# Patient Record
Sex: MALE | Race: BLACK OR AFRICAN AMERICAN | Employment: OTHER | URBAN - METROPOLITAN AREA
[De-identification: names, ages, dates, MRNs, and addresses within clinical notes are randomized per-mention and may not be internally consistent; named-entity substitution may affect disease eponyms.]

---

## 2022-03-30 ENCOUNTER — APPOINTMENT (OUTPATIENT)
Dept: CT IMAGING | Age: 52
DRG: 469 | End: 2022-03-30
Payer: MEDICAID

## 2022-03-30 ENCOUNTER — HOSPITAL ENCOUNTER (INPATIENT)
Age: 52
LOS: 2 days | Discharge: NURSING, REHAB OR LONG-TERM CARE | DRG: 469 | End: 2022-04-01
Attending: STUDENT IN AN ORGANIZED HEALTH CARE EDUCATION/TRAINING PROGRAM | Admitting: INTERNAL MEDICINE
Payer: MEDICAID

## 2022-03-30 DIAGNOSIS — E87.20 LACTIC ACIDOSIS: ICD-10-CM

## 2022-03-30 DIAGNOSIS — F10.10 ALCOHOL ABUSE: Primary | ICD-10-CM

## 2022-03-30 LAB
A/G RATIO: 0.9 (ref 1.1–2.2)
ALBUMIN SERPL-MCNC: 3.3 G/DL (ref 3.4–5)
ALP BLD-CCNC: 118 U/L (ref 40–129)
ALT SERPL-CCNC: 50 U/L (ref 10–40)
ANION GAP SERPL CALCULATED.3IONS-SCNC: 17 MMOL/L (ref 3–16)
AST SERPL-CCNC: 146 U/L (ref 15–37)
BASOPHILS ABSOLUTE: 0 K/UL (ref 0–0.2)
BASOPHILS RELATIVE PERCENT: 0.4 %
BILIRUB SERPL-MCNC: 2 MG/DL (ref 0–1)
BUN BLDV-MCNC: 19 MG/DL (ref 7–20)
CALCIUM SERPL-MCNC: 8.9 MG/DL (ref 8.3–10.6)
CHLORIDE BLD-SCNC: 105 MMOL/L (ref 99–110)
CO2: 19 MMOL/L (ref 21–32)
CREAT SERPL-MCNC: 1.9 MG/DL (ref 0.9–1.3)
EOSINOPHILS ABSOLUTE: 0 K/UL (ref 0–0.6)
EOSINOPHILS RELATIVE PERCENT: 0.1 %
ETHANOL: 187 MG/DL (ref 0–0.08)
GFR AFRICAN AMERICAN: 45
GFR NON-AFRICAN AMERICAN: 37
GLUCOSE BLD-MCNC: 105 MG/DL (ref 70–99)
HCT VFR BLD CALC: 40.5 % (ref 40.5–52.5)
HEMOGLOBIN: 13.5 G/DL (ref 13.5–17.5)
LACTIC ACID, SEPSIS: 4.9 MMOL/L (ref 0.4–1.9)
LACTIC ACID: 5.4 MMOL/L (ref 0.4–2)
LIPASE: 22 U/L (ref 13–60)
LYMPHOCYTES ABSOLUTE: 0.5 K/UL (ref 1–5.1)
LYMPHOCYTES RELATIVE PERCENT: 4.8 %
MCH RBC QN AUTO: 28.5 PG (ref 26–34)
MCHC RBC AUTO-ENTMCNC: 33.2 G/DL (ref 31–36)
MCV RBC AUTO: 85.6 FL (ref 80–100)
MONOCYTES ABSOLUTE: 0.4 K/UL (ref 0–1.3)
MONOCYTES RELATIVE PERCENT: 4.1 %
NEUTROPHILS ABSOLUTE: 10 K/UL (ref 1.7–7.7)
NEUTROPHILS RELATIVE PERCENT: 90.6 %
PDW BLD-RTO: 17.2 % (ref 12.4–15.4)
PLATELET # BLD: 130 K/UL (ref 135–450)
PMV BLD AUTO: 7.9 FL (ref 5–10.5)
POTASSIUM REFLEX MAGNESIUM: 4.5 MMOL/L (ref 3.5–5.1)
RBC # BLD: 4.73 M/UL (ref 4.2–5.9)
SODIUM BLD-SCNC: 141 MMOL/L (ref 136–145)
TOTAL PROTEIN: 6.9 G/DL (ref 6.4–8.2)
WBC # BLD: 11 K/UL (ref 4–11)

## 2022-03-30 PROCEDURE — 99283 EMERGENCY DEPT VISIT LOW MDM: CPT

## 2022-03-30 PROCEDURE — 82077 ASSAY SPEC XCP UR&BREATH IA: CPT

## 2022-03-30 PROCEDURE — 80053 COMPREHEN METABOLIC PANEL: CPT

## 2022-03-30 PROCEDURE — 96361 HYDRATE IV INFUSION ADD-ON: CPT

## 2022-03-30 PROCEDURE — 85025 COMPLETE CBC W/AUTO DIFF WBC: CPT

## 2022-03-30 PROCEDURE — 6360000004 HC RX CONTRAST MEDICATION: Performed by: STUDENT IN AN ORGANIZED HEALTH CARE EDUCATION/TRAINING PROGRAM

## 2022-03-30 PROCEDURE — 83605 ASSAY OF LACTIC ACID: CPT

## 2022-03-30 PROCEDURE — 74177 CT ABD & PELVIS W/CONTRAST: CPT

## 2022-03-30 PROCEDURE — 83690 ASSAY OF LIPASE: CPT

## 2022-03-30 PROCEDURE — 36415 COLL VENOUS BLD VENIPUNCTURE: CPT

## 2022-03-30 PROCEDURE — 2580000003 HC RX 258: Performed by: STUDENT IN AN ORGANIZED HEALTH CARE EDUCATION/TRAINING PROGRAM

## 2022-03-30 PROCEDURE — 96360 HYDRATION IV INFUSION INIT: CPT

## 2022-03-30 PROCEDURE — 1200000000 HC SEMI PRIVATE

## 2022-03-30 RX ORDER — SENNA PLUS 8.6 MG/1
1 TABLET ORAL 2 TIMES DAILY
COMMUNITY

## 2022-03-30 RX ORDER — SODIUM CHLORIDE 1000 MG
1 TABLET, SOLUBLE MISCELLANEOUS DAILY
COMMUNITY

## 2022-03-30 RX ORDER — 0.9 % SODIUM CHLORIDE 0.9 %
1000 INTRAVENOUS SOLUTION INTRAVENOUS ONCE
Status: COMPLETED | OUTPATIENT
Start: 2022-03-30 | End: 2022-03-30

## 2022-03-30 RX ORDER — ACETAMINOPHEN 325 MG/1
650 TABLET ORAL EVERY 4 HOURS PRN
COMMUNITY

## 2022-03-30 RX ORDER — TIZANIDINE 4 MG/1
4 TABLET ORAL EVERY 6 HOURS PRN
COMMUNITY

## 2022-03-30 RX ORDER — POLYETHYLENE GLYCOL 3350 17 G/17G
17 POWDER, FOR SOLUTION ORAL DAILY PRN
COMMUNITY

## 2022-03-30 RX ORDER — NIFEDIPINE 30 MG/1
30 TABLET, FILM COATED, EXTENDED RELEASE ORAL DAILY
COMMUNITY

## 2022-03-30 RX ORDER — FOLIC ACID 1 MG/1
1 TABLET ORAL DAILY
Status: ON HOLD | COMMUNITY
End: 2022-04-01 | Stop reason: SDUPTHER

## 2022-03-30 RX ADMIN — SODIUM CHLORIDE 1000 ML: 9 INJECTION, SOLUTION INTRAVENOUS at 18:05

## 2022-03-30 RX ADMIN — IOPAMIDOL 75 ML: 755 INJECTION, SOLUTION INTRAVENOUS at 18:50

## 2022-03-30 RX ADMIN — SODIUM CHLORIDE 1000 ML: 9 INJECTION, SOLUTION INTRAVENOUS at 20:30

## 2022-03-30 NOTE — ED TRIAGE NOTES
Pt arrives via medic from NH intoxicated. Pt reportedly signed himself out of the nursing home today to go buy alcohol, which he does frequently per nursing home staff. Medics report pt called 3 times first for a ride back to the nursing home, second for feeling weak but then he changed his mind and did not want to come. The third time he called they brought him in to the ER. Pt reports throwing up x1. States he drank 2 24oz. beers today. Smells of alcohol. A&Ox3.

## 2022-03-30 NOTE — ED PROVIDER NOTES
Primary Care Physician: Corinne Mendez MD   Attending Physician: No att. providers found     History   Chief Complaint   Patient presents with    Alcohol Intoxication     left nursing home to buy beer. medics were called on him 3 times today the final time they borught him to the ED. pt reports dinking 2 24 oz beers today        HPI   Braxton Bradley  is a 47 y.o. male history of alcohol abuse with liver cirrhosis, hyperlipidemia who presents complaining of abdominal pain associated with nausea vomiting. Patient stated that he been drinking heavily today. He started throwing up presented to the emergency room. Past Medical History:   Diagnosis Date    Alcohol abuse     Alcoholic cirrhosis of liver (HCC)     Anemia     Gastro-esophageal reflux disease with esophagitis     Hyperlipidemia     Thrombocytopenia (HCC)     Vitamin D deficiency         History reviewed. No pertinent surgical history. History reviewed. No pertinent family history. Social History     Socioeconomic History    Marital status: Single     Spouse name: None    Number of children: None    Years of education: None    Highest education level: None   Occupational History    None   Tobacco Use    Smoking status: Never Smoker    Smokeless tobacco: Never Used   Vaping Use    Vaping Use: Never used   Substance and Sexual Activity    Alcohol use: Yes     Comment: daily beer    Drug use: Never    Sexual activity: None   Other Topics Concern    None   Social History Narrative    None     Social Determinants of Health     Financial Resource Strain:     Difficulty of Paying Living Expenses: Not on file   Food Insecurity:     Worried About Running Out of Food in the Last Year: Not on file    Moses of Food in the Last Year: Not on file   Transportation Needs:     Lack of Transportation (Medical): Not on file    Lack of Transportation (Non-Medical):  Not on file   Physical Activity:     Days of Exercise per Week: Not on file   [employer] of Exercise per Session: Not on file   Stress:     Feeling of Stress : Not on file   Social Connections:     Frequency of Communication with Friends and Family: Not on file    Frequency of Social Gatherings with Friends and Family: Not on file    Attends Buddhist Services: Not on file    Active Member of [address] [place] [address] or Organizations: Not on file    Attends Club or Organization Meetings: Not on file    Marital Status: Not on file   Intimate Partner Violence:     Fear of Current or Ex-Partner: Not on file    Emotionally Abused: Not on file    Physically Abused: Not on file    Sexually Abused: Not on file   Housing Stability:     Unable to Pay for Housing in the Last Year: Not on file    Number of [place] in the Last Year: Not on file    Unstable Housing in the Last Year: Not on file        Review of Systems   10 total systems reviewed and found to be negative unless otherwise noted in HPI     Physical Exam   /72   Pulse 120   Temp 98.2 °F (36.8 °C) (Oral)   Resp 18   Ht 6' 2\" (1.88 m)   Wt 212 lb 15.4 oz (96.6 kg)   SpO2 95%   BMI 27.34 kg/m²      CONSTITUTIONAL: Well appearing, in no acute distress   HEAD: atraumatic, normocephalic   EYES: PERRL, No injection, discharge or scleral icterus. ENT: Moist mucous membranes. NECK: Normal ROM, NO LAD   CARDIOVASCULAR: Regular rate and rhythm. No murmurs or gallop. PULMONARY/CHEST: Airway patent. No retractions. Breath sounds clear with good air entry bilaterally. ABDOMEN: Soft, Non-distended and non-tender, without guarding or rebound. SKIN: Acyanotic, warm, dry   MUSCULOSKELETAL: No swelling, tenderness or deformity   NEUROLOGICAL: Awake and oriented x 3. Pulses intact. Grossly nonfocal   Nursing note and vitals reviewed.      ED Course & Medical Decision Making   Medications   0.9 % sodium chloride bolus (0 mLs IntraVENous Stopped 3/30/22 1905)   iopamidol (ISOVUE-370) 76 % injection 75 mL (75 mLs IntraVENous Given 3/30/22 1850)   0.9 % sodium chloride bolus (0 mLs IntraVENous Stopped 3/30/22 2130)   magnesium sulfate 1000 mg in dextrose 5% 100 mL IVPB (0 mg IntraVENous Stopped 4/1/22 1848)      Labs Reviewed   CBC WITH AUTO DIFFERENTIAL - Abnormal; Notable for the following components:       Result Value    RDW 17.2 (*)     Platelets 789 (*)     Neutrophils Absolute 10.0 (*)     Lymphocytes Absolute 0.5 (*)     All other components within normal limits   COMPREHENSIVE METABOLIC PANEL W/ REFLEX TO MG FOR LOW K - Abnormal; Notable for the following components:    CO2 19 (*)     Anion Gap 17 (*)     Glucose 105 (*)     CREATININE 1.9 (*)     GFR Non- 37 (*)     GFR  45 (*)     Albumin 3.3 (*)     Albumin/Globulin Ratio 0.9 (*)     Total Bilirubin 2.0 (*)     ALT 50 (*)      (*)     All other components within normal limits   LACTATE, SEPSIS - Abnormal; Notable for the following components:    Lactic Acid, Sepsis 4.9 (*)     All other components within normal limits    Narrative:     Lyndal Reveal tel. [phone],  Chemistry results called to and read back by Jill Harris RN,  03/30/2022 18:41, by Daniel Cullen   LACTIC ACID - Abnormal; Notable for the following components:    Lactic Acid 5.4 (*)     All other components within normal limits    Narrative:     Lyndal Reveal tel. [phone],  Chemistry results called to and read back by ESTRELLA Linares., 03/30/2022 22:02, by  MATILDA   LACTIC ACID - Abnormal; Notable for the following components:    Lactic Acid 6.8 (*)     All other components within normal limits    Narrative:     Theoplis Caleb  [facility] tel. [phone],  Chemistry results called to and read back by Gregorio Peng RN, 03/31/2022  03:04, by BLUSH  Collection has been rescheduled by LISSA at 03/31/2022 00:59 Reason:   2000hr should have been drawn in the ed  Draw 2am    LACTIC ACID - Abnormal; Notable for the following components:    Lactic Acid 3.6 (*)     All other components within normal limits    Narrative:     Collection has been rescheduled by CHRYSTAL at 03/31/2022 08:35 Reason:   Failed attempt at venipuncture   CBC WITH AUTO DIFFERENTIAL - Abnormal; Notable for the following components:    WBC 17.7 (*)     Hemoglobin 13.1 (*)     Hematocrit 39.1 (*)     RDW 17.0 (*)     Platelets 92 (*)     Neutrophils Absolute 16.2 (*)     Lymphocytes Absolute 0.8 (*)     All other components within normal limits   COMPREHENSIVE METABOLIC PANEL W/ REFLEX TO MG FOR LOW K - Abnormal; Notable for the following components:    CO2 17 (*)     GFR Non-African American 58 (*)     Calcium 8.2 (*)     Total Protein 6.3 (*)     Albumin 2.7 (*)     Albumin/Globulin Ratio 0.8 (*)     Total Bilirubin 1.2 (*)     ALT 63 (*)      (*)     All other components within normal limits   LACTIC ACID - Abnormal; Notable for the following components:    Lactic Acid 3.7 (*)     All other components within normal limits   LACTIC ACID - Abnormal; Notable for the following components:    Lactic Acid 2.7 (*)     All other components within normal limits   LACTIC ACID - Abnormal; Notable for the following components:    Lactic Acid 2.1 (*)     All other components within normal limits   CBC WITH AUTO DIFFERENTIAL - Abnormal; Notable for the following components:    Hemoglobin 12.7 (*)     Hematocrit 38.2 (*)     RDW 16.9 (*)     Platelets 90 (*)     Neutrophils Absolute 9.1 (*)     All other components within normal limits    Narrative:     Collection has been rescheduled by LISSA at 04/01/2022 05:28 Reason:   Draw at 730a w/8am labs   COMPREHENSIVE METABOLIC PANEL W/ REFLEX TO MG FOR LOW K - Abnormal; Notable for the following components:    CREATININE 0.7 (*)     Albumin 2.7 (*)     Albumin/Globulin Ratio 0.6 (*)     Total Bilirubin 1.3 (*)     ALT 49 (*)     AST 88 (*)     All other components within normal limits    Narrative:     Collection has been rescheduled by LISSA at 04/01/2022 05:28 Reason:   Draw at 730a w/8am labs MAGNESIUM - Abnormal; Notable for the following components:    Magnesium 1.60 (*)     All other components within normal limits    Narrative:     Collection has been rescheduled by LISSA at 04/01/2022 05:28 Reason:   Draw at 730a w/8am labs   BILIRUBIN, DIRECT - Abnormal; Notable for the following components:    Bilirubin, Direct 0.5 (*)     All other components within normal limits    Narrative:     Collection has been rescheduled by Ratna Ewing at 04/01/2022 12:09 Reason: Add   on    [address] ACID    Narrative:     Collection has been rescheduled by Marlena Severs at 04/01/2022 05:28 Reason:   Draw at 730a w/8am labs   URINALYSIS WITH REFLEX TO CULTURE      VL AORTA/IVC/ILIAC/BYPASS GRAFT LIMITED   Final Result      US ABDOMEN LIMITED Specify organ? LIVER, GALLBLADDER   Final Result   Hepatic steatosis with subtle/early cirrhotic changes suspected. CT ABDOMEN PELVIS W IV CONTRAST Additional Contrast? None   Final Result   No acute pathology in the abdomen and pelvis. No evidence of acute   appendicitis. PROCEDURES:   Procedures    ASSESSMENT AND PLAN:  [id number], Jessa Rubio is a 47 y.o. male history of alcohol abuse with liver cirrhosis, hyperlipidemia who presents complaining of abdominal pain associated with nausea vomiting. Patient stated that he been drinking heavily today. He started throwing up presented to the emergency room. On exam patient was hypotensive when he presented. Abdomen was tender to palpation. I did obtain labs which was significant for lactate of 4. Patient was given 2 L of IV fluids and lactate is still up to 4.5. A CT of the abdomen pelvis has been obtained and showed no significant surgical abdominal pathology. The cause of elevated lactate at this time probably could be secondary to his alcohol versus liver disease.   Nonetheless with this abnormal finding and recommending the patient be admitted for further evaluation and

## 2022-03-31 ENCOUNTER — APPOINTMENT (OUTPATIENT)
Dept: ULTRASOUND IMAGING | Age: 52
DRG: 469 | End: 2022-03-31
Payer: MEDICAID

## 2022-03-31 PROBLEM — R10.9 ABDOMINAL PAIN: Status: ACTIVE | Noted: 2022-03-31

## 2022-03-31 PROBLEM — R11.2 NAUSEA AND VOMITING: Status: ACTIVE | Noted: 2022-03-31

## 2022-03-31 PROBLEM — R74.01 TRANSAMINITIS: Status: ACTIVE | Noted: 2022-03-31

## 2022-03-31 PROBLEM — E44.0 MODERATE PROTEIN-CALORIE MALNUTRITION (HCC): Status: ACTIVE | Noted: 2022-03-31

## 2022-03-31 PROBLEM — N17.9 ACUTE RENAL FAILURE (ARF) (HCC): Status: ACTIVE | Noted: 2022-03-31

## 2022-03-31 PROBLEM — R17 ELEVATED BILIRUBIN: Status: ACTIVE | Noted: 2022-03-31

## 2022-03-31 PROBLEM — F10.929 ALCOHOL INTOXICATION (HCC): Status: ACTIVE | Noted: 2022-03-31

## 2022-03-31 PROBLEM — R79.89 ELEVATED LACTIC ACID LEVEL: Status: ACTIVE | Noted: 2022-03-31

## 2022-03-31 PROBLEM — K70.30 ALCOHOLIC CIRRHOSIS (HCC): Status: ACTIVE | Noted: 2022-03-31

## 2022-03-31 PROBLEM — E87.29 HIGH ANION GAP METABOLIC ACIDOSIS: Status: ACTIVE | Noted: 2022-03-31

## 2022-03-31 PROBLEM — E88.09 HYPOALBUMINEMIA: Status: ACTIVE | Noted: 2022-03-31

## 2022-03-31 LAB
A/G RATIO: 0.8 (ref 1.1–2.2)
ALBUMIN SERPL-MCNC: 2.7 G/DL (ref 3.4–5)
ALP BLD-CCNC: 97 U/L (ref 40–129)
ALT SERPL-CCNC: 63 U/L (ref 10–40)
ANION GAP SERPL CALCULATED.3IONS-SCNC: 16 MMOL/L (ref 3–16)
AST SERPL-CCNC: 183 U/L (ref 15–37)
BASOPHILS ABSOLUTE: 0 K/UL (ref 0–0.2)
BASOPHILS RELATIVE PERCENT: 0.2 %
BILIRUB SERPL-MCNC: 1.2 MG/DL (ref 0–1)
BUN BLDV-MCNC: 19 MG/DL (ref 7–20)
CALCIUM SERPL-MCNC: 8.2 MG/DL (ref 8.3–10.6)
CHLORIDE BLD-SCNC: 109 MMOL/L (ref 99–110)
CO2: 17 MMOL/L (ref 21–32)
CREAT SERPL-MCNC: 1.3 MG/DL (ref 0.9–1.3)
EOSINOPHILS ABSOLUTE: 0 K/UL (ref 0–0.6)
EOSINOPHILS RELATIVE PERCENT: 0 %
GFR AFRICAN AMERICAN: >60
GFR NON-AFRICAN AMERICAN: 58
GLUCOSE BLD-MCNC: 90 MG/DL (ref 70–99)
HCT VFR BLD CALC: 39.1 % (ref 40.5–52.5)
HEMOGLOBIN: 13.1 G/DL (ref 13.5–17.5)
INR BLD: 0.92 (ref 0.88–1.12)
LACTIC ACID: 2.7 MMOL/L (ref 0.4–2)
LACTIC ACID: 3.6 MMOL/L (ref 0.4–2)
LACTIC ACID: 3.7 MMOL/L (ref 0.4–2)
LACTIC ACID: 6.8 MMOL/L (ref 0.4–2)
LYMPHOCYTES ABSOLUTE: 0.8 K/UL (ref 1–5.1)
LYMPHOCYTES RELATIVE PERCENT: 4.5 %
MCH RBC QN AUTO: 28.8 PG (ref 26–34)
MCHC RBC AUTO-ENTMCNC: 33.4 G/DL (ref 31–36)
MCV RBC AUTO: 86.2 FL (ref 80–100)
MONOCYTES ABSOLUTE: 0.7 K/UL (ref 0–1.3)
MONOCYTES RELATIVE PERCENT: 3.7 %
NEUTROPHILS ABSOLUTE: 16.2 K/UL (ref 1.7–7.7)
NEUTROPHILS RELATIVE PERCENT: 91.6 %
PDW BLD-RTO: 17 % (ref 12.4–15.4)
PLATELET # BLD: 92 K/UL (ref 135–450)
PMV BLD AUTO: 8.1 FL (ref 5–10.5)
POTASSIUM REFLEX MAGNESIUM: 4.1 MMOL/L (ref 3.5–5.1)
PROTHROMBIN TIME: 10.4 SEC (ref 9.9–12.7)
RBC # BLD: 4.54 M/UL (ref 4.2–5.9)
SODIUM BLD-SCNC: 142 MMOL/L (ref 136–145)
TOTAL PROTEIN: 6.3 G/DL (ref 6.4–8.2)
WBC # BLD: 17.7 K/UL (ref 4–11)

## 2022-03-31 PROCEDURE — 2580000003 HC RX 258: Performed by: INTERNAL MEDICINE

## 2022-03-31 PROCEDURE — 6370000000 HC RX 637 (ALT 250 FOR IP): Performed by: INTERNAL MEDICINE

## 2022-03-31 PROCEDURE — 2580000003 HC RX 258: Performed by: NURSE PRACTITIONER

## 2022-03-31 PROCEDURE — 6370000000 HC RX 637 (ALT 250 FOR IP): Performed by: NURSE PRACTITIONER

## 2022-03-31 PROCEDURE — 85610 PROTHROMBIN TIME: CPT

## 2022-03-31 PROCEDURE — 1200000000 HC SEMI PRIVATE

## 2022-03-31 PROCEDURE — 36415 COLL VENOUS BLD VENIPUNCTURE: CPT

## 2022-03-31 PROCEDURE — 6360000002 HC RX W HCPCS: Performed by: INTERNAL MEDICINE

## 2022-03-31 PROCEDURE — 85025 COMPLETE CBC W/AUTO DIFF WBC: CPT

## 2022-03-31 PROCEDURE — 80053 COMPREHEN METABOLIC PANEL: CPT

## 2022-03-31 PROCEDURE — 76705 ECHO EXAM OF ABDOMEN: CPT

## 2022-03-31 PROCEDURE — 83605 ASSAY OF LACTIC ACID: CPT

## 2022-03-31 RX ORDER — FOLIC ACID 1 MG/1
1 TABLET ORAL DAILY
Status: DISCONTINUED | OUTPATIENT
Start: 2022-03-31 | End: 2022-04-02 | Stop reason: HOSPADM

## 2022-03-31 RX ORDER — POLYETHYLENE GLYCOL 3350 17 G/17G
17 POWDER, FOR SOLUTION ORAL DAILY PRN
Status: DISCONTINUED | OUTPATIENT
Start: 2022-03-31 | End: 2022-04-02 | Stop reason: HOSPADM

## 2022-03-31 RX ORDER — SODIUM CHLORIDE 0.9 % (FLUSH) 0.9 %
10 SYRINGE (ML) INJECTION PRN
Status: DISCONTINUED | OUTPATIENT
Start: 2022-03-31 | End: 2022-04-02 | Stop reason: HOSPADM

## 2022-03-31 RX ORDER — CHLORDIAZEPOXIDE HYDROCHLORIDE 5 MG/1
10 CAPSULE, GELATIN COATED ORAL 3 TIMES DAILY
Status: DISCONTINUED | OUTPATIENT
Start: 2022-03-31 | End: 2022-04-02 | Stop reason: HOSPADM

## 2022-03-31 RX ORDER — M-VIT,TX,IRON,MINS/CALC/FOLIC 27MG-0.4MG
1 TABLET ORAL DAILY
Status: DISCONTINUED | OUTPATIENT
Start: 2022-03-31 | End: 2022-04-02 | Stop reason: HOSPADM

## 2022-03-31 RX ORDER — PROMETHAZINE HYDROCHLORIDE 25 MG/ML
25 INJECTION, SOLUTION INTRAMUSCULAR; INTRAVENOUS EVERY 4 HOURS PRN
Status: DISCONTINUED | OUTPATIENT
Start: 2022-03-31 | End: 2022-04-02 | Stop reason: HOSPADM

## 2022-03-31 RX ORDER — SODIUM CHLORIDE 1000 MG
1 TABLET, SOLUBLE MISCELLANEOUS DAILY
Status: DISCONTINUED | OUTPATIENT
Start: 2022-03-31 | End: 2022-04-02 | Stop reason: HOSPADM

## 2022-03-31 RX ORDER — GAUZE BANDAGE 2" X 2"
100 BANDAGE TOPICAL DAILY
Status: DISCONTINUED | OUTPATIENT
Start: 2022-03-31 | End: 2022-04-02 | Stop reason: HOSPADM

## 2022-03-31 RX ORDER — NIFEDIPINE 30 MG/1
30 TABLET, EXTENDED RELEASE ORAL DAILY
Status: DISCONTINUED | OUTPATIENT
Start: 2022-03-31 | End: 2022-04-02 | Stop reason: HOSPADM

## 2022-03-31 RX ORDER — ACETAMINOPHEN 325 MG/1
650 TABLET ORAL EVERY 4 HOURS PRN
Status: DISCONTINUED | OUTPATIENT
Start: 2022-03-31 | End: 2022-04-02 | Stop reason: HOSPADM

## 2022-03-31 RX ORDER — SODIUM CHLORIDE 9 MG/ML
INJECTION, SOLUTION INTRAVENOUS PRN
Status: DISCONTINUED | OUTPATIENT
Start: 2022-03-31 | End: 2022-04-02 | Stop reason: HOSPADM

## 2022-03-31 RX ORDER — PANTOPRAZOLE SODIUM 40 MG/1
40 TABLET, DELAYED RELEASE ORAL
Status: DISCONTINUED | OUTPATIENT
Start: 2022-03-31 | End: 2022-04-02 | Stop reason: HOSPADM

## 2022-03-31 RX ORDER — ACETAMINOPHEN 650 MG/1
650 SUPPOSITORY RECTAL EVERY 4 HOURS PRN
Status: DISCONTINUED | OUTPATIENT
Start: 2022-03-31 | End: 2022-04-02 | Stop reason: HOSPADM

## 2022-03-31 RX ORDER — SODIUM CHLORIDE 9 MG/ML
INJECTION, SOLUTION INTRAVENOUS CONTINUOUS
Status: DISCONTINUED | OUTPATIENT
Start: 2022-03-31 | End: 2022-04-01

## 2022-03-31 RX ORDER — SODIUM CHLORIDE 0.9 % (FLUSH) 0.9 %
10 SYRINGE (ML) INJECTION EVERY 12 HOURS SCHEDULED
Status: DISCONTINUED | OUTPATIENT
Start: 2022-03-31 | End: 2022-04-02 | Stop reason: HOSPADM

## 2022-03-31 RX ORDER — ONDANSETRON 2 MG/ML
4 INJECTION INTRAMUSCULAR; INTRAVENOUS EVERY 4 HOURS PRN
Status: DISCONTINUED | OUTPATIENT
Start: 2022-03-31 | End: 2022-04-02 | Stop reason: HOSPADM

## 2022-03-31 RX ADMIN — ACETAMINOPHEN 650 MG: 325 TABLET ORAL at 01:02

## 2022-03-31 RX ADMIN — THIAMINE HCL TAB 100 MG 100 MG: 100 TAB at 08:31

## 2022-03-31 RX ADMIN — PANTOPRAZOLE SODIUM 40 MG: 40 TABLET, DELAYED RELEASE ORAL at 18:08

## 2022-03-31 RX ADMIN — ACETAMINOPHEN 650 MG: 325 TABLET ORAL at 17:57

## 2022-03-31 RX ADMIN — NIFEDIPINE 30 MG: 30 TABLET, FILM COATED, EXTENDED RELEASE ORAL at 08:31

## 2022-03-31 RX ADMIN — PANTOPRAZOLE SODIUM 40 MG: 40 TABLET, DELAYED RELEASE ORAL at 08:31

## 2022-03-31 RX ADMIN — Medication 1 G: at 08:31

## 2022-03-31 RX ADMIN — ENOXAPARIN SODIUM 40 MG: 40 INJECTION SUBCUTANEOUS at 08:30

## 2022-03-31 RX ADMIN — SODIUM CHLORIDE: 9 INJECTION, SOLUTION INTRAVENOUS at 08:28

## 2022-03-31 RX ADMIN — ACETAMINOPHEN 650 MG: 325 TABLET ORAL at 12:14

## 2022-03-31 RX ADMIN — Medication 1 TABLET: at 08:31

## 2022-03-31 RX ADMIN — SODIUM CHLORIDE, PRESERVATIVE FREE 10 ML: 5 INJECTION INTRAVENOUS at 23:14

## 2022-03-31 RX ADMIN — ONDANSETRON 4 MG: 2 INJECTION INTRAMUSCULAR; INTRAVENOUS at 01:58

## 2022-03-31 RX ADMIN — CHLORDIAZEPOXIDE HYDROCHLORIDE 10 MG: 5 CAPSULE ORAL at 13:15

## 2022-03-31 RX ADMIN — SODIUM CHLORIDE: 9 INJECTION, SOLUTION INTRAVENOUS at 01:31

## 2022-03-31 RX ADMIN — ONDANSETRON 4 MG: 2 INJECTION INTRAMUSCULAR; INTRAVENOUS at 11:08

## 2022-03-31 RX ADMIN — ACETAMINOPHEN 650 MG: 325 TABLET ORAL at 21:18

## 2022-03-31 RX ADMIN — SODIUM CHLORIDE: 9 INJECTION, SOLUTION INTRAVENOUS at 17:59

## 2022-03-31 RX ADMIN — FOLIC ACID 1 MG: 1 TABLET ORAL at 08:31

## 2022-03-31 RX ADMIN — CHLORDIAZEPOXIDE HYDROCHLORIDE 10 MG: 5 CAPSULE ORAL at 21:18

## 2022-03-31 ASSESSMENT — PAIN DESCRIPTION - LOCATION
LOCATION: ABDOMEN;HEAD
LOCATION: GENERALIZED
LOCATION: ABDOMEN

## 2022-03-31 ASSESSMENT — PAIN SCALES - GENERAL
PAINLEVEL_OUTOF10: 0
PAINLEVEL_OUTOF10: 0
PAINLEVEL_OUTOF10: 4
PAINLEVEL_OUTOF10: 4
PAINLEVEL_OUTOF10: 6
PAINLEVEL_OUTOF10: 0
PAINLEVEL_OUTOF10: 5
PAINLEVEL_OUTOF10: 0
PAINLEVEL_OUTOF10: 0

## 2022-03-31 ASSESSMENT — PAIN DESCRIPTION - DESCRIPTORS
DESCRIPTORS: ACHING
DESCRIPTORS: DISCOMFORT

## 2022-03-31 ASSESSMENT — PAIN DESCRIPTION - ORIENTATION: ORIENTATION: MID

## 2022-03-31 ASSESSMENT — PAIN DESCRIPTION - FREQUENCY
FREQUENCY: INTERMITTENT
FREQUENCY: INTERMITTENT

## 2022-03-31 NOTE — CONSULTS
GASTROENTEROLOGY INPATIENT CONSULTATION        IDENTIFYING DATA/REASON FOR CONSULTATION   PATIENT:  Jessa Rubio  MRN:  [id number]  ADMIT DATE: 3/30/2022  TIME OF EVALUATION: 3/31/2022 3:15 PM  HOSPITAL STAY:   LOS: 1 day     REASON FOR CONSULTATION:  Elevated LFTs, Alcohol cirrhosis    HISTORY OF PRESENT ILLNESS   Jessa Rubio is a 47 y.o. male with a PMH of daily alcohol use who presented on 3/30/2022 with abdominal pain, nausea and vomiting. He was found to be intoxicated in ED. Blood work was notable for elevated LFTs, elevated lactic acid, LAUREN. He was admitted and placed on IV fluids. We have been consulted regarding \"elevated LFTs, alcoholic cirrhosis\"    Patient reports nausea vomiting abdominal pain started last night. He reports he drinks 2-4 beers daily has been drinking for 10+ years. His last alcoholic drink was yesterday afternoon. He does have a history of withdrawal symptoms. He states he felt like he was going into withdrawal last night. He denies any prior diagnosis of liver disease. He denies seeing any blood in his vomit. Today he is feeling better. He last vomited this morning. He has been able to tolerate liquids in the afternoon. He has been having no further abdominal pain. CT A/P shows no acute pathology in the abdomen and pelvis. Liver enhances normally. No ductal dilation. PAST MEDICAL, SURGICAL, FAMILY, and SOCIAL HISTORY     Past Medical History:   Diagnosis Date    Alcohol abuse     Alcoholic cirrhosis of liver (HCC)     Anemia     Gastro-esophageal reflux disease with esophagitis     Hyperlipidemia     Thrombocytopenia (HCC)     Vitamin D deficiency      History reviewed. No pertinent surgical history. History reviewed. No pertinent family history.   Social History     Socioeconomic History    Marital status: Single     Spouse name: None    Number of children: None    Years of education: None    Highest education level: None   Occupational History    None   Tobacco Use    Smoking status: Never Smoker    Smokeless tobacco: Never Used   Vaping Use    Vaping Use: Never used   Substance and Sexual Activity    Alcohol use: Yes     Comment: daily beer    Drug use: Never    Sexual activity: None   Other Topics Concern    None   Social History Narrative    None     Social Determinants of Health     Financial Resource Strain:     Difficulty of Paying Living Expenses: Not on file   Food Insecurity:     Worried About Running Out of Food in the Last Year: Not on file    Moses of Food in the Last Year: Not on file   Transportation Needs:     Lack of Transportation (Medical): Not on file    Lack of Transportation (Non-Medical):  Not on file   Physical Activity:     Days of Exercise per Week: Not on file    Minutes of Exercise per Session: Not on file   Stress:     Feeling of Stress : Not on file   Social Connections:     Frequency of Communication with Friends and Family: Not on file    Frequency of Social Gatherings with Friends and Family: Not on file    Attends [employer] Services: Not on file    Active Member of [address] [place] [address] or Organizations: Not on file    Attends Club or Organization Meetings: Not on file    Marital Status: Not on file   Intimate Partner Violence:     Fear of Current or Ex-Partner: Not on file    Emotionally Abused: Not on file    Physically Abused: Not on file    Sexually Abused: Not on file   Housing Stability:     Unable to Pay for Housing in the Last Year: Not on file    Number of [place] in the Last Year: Not on file    Unstable Housing in the Last Year: Not on file       MEDICATIONS   SCHEDULED:  sodium chloride, 1 g, Daily  NIFEdipine, 30 mg, Daily  folic acid, 1 mg, Daily  therapeutic multivitamin-minerals, 1 tablet, Daily  thiamine mononitrate, 100 mg, Daily  sodium chloride flush, 10 mL, 2 times per day  enoxaparin, 40 mg, Daily  pantoprazole, 40 mg, BID AC  chlordiazePOXIDE, 10 mg, TID      FLUIDS/DRIPS:  sodium chloride      sodium chloride 100 mL/hr at 03/31/22 1208     PRNs: sodium chloride flush, 10 mL, PRN  sodium chloride, , PRN  ondansetron, 4 mg, Q4H PRN  polyethylene glycol, 17 g, Daily PRN  acetaminophen, 650 mg, Q4H PRN   Or  acetaminophen, 650 mg, Q4H PRN  promethazine, 25 mg, Q4H PRN      ALLERGIES:  He   Allergies   Allergen Reactions    Other      Bleach       REVIEW OF SYSTEMS   Pertinent ROS noted in HPI    PHYSICAL EXAM     Vitals:    03/31/22 0221 03/31/22 0531 03/31/22 1415 03/31/22 1431   BP:  (!) 142/75 (!) 147/68    Pulse:  80 85    Resp:  18 16    Temp: 99.9 °F (37.7 °C) 98.9 °F (37.2 °C) 99 °F (37.2 °C)    TempSrc: Oral Oral Oral    SpO2:  99% 98%    Weight:  212 lb 15.4 oz (96.6 kg)     Height:    6' 2\" (1.88 m)       I/O last 3 completed shifts: In: 360 [P.O.:360]  Out: 200 [Emesis/NG output:200]      Physical Exam:  General appearance: alert, cooperative, no distress, appears stated age  Eyes: Anicteric  Head: Normocephalic, without obvious abnormality  Lungs: clear to auscultation bilaterally, Normal Effort  Heart: regular rate and rhythm, normal S1 and S2, no murmurs or rubs  Abdomen: soft, non-distended, non-tender. Bowel sounds normal. No masses,  no organomegaly. Extremities: atraumatic, no cyanosis or edema  Skin: warm and dry, no jaundice  Neuro: Grossly intact, A&OX3, no asterixis      LABS AND IMAGING   Laboratory   Recent Labs     03/30/22 1802 03/31/22  0454   WBC 11.0 17.7*   HGB 13.5 13.1*   HCT 40.5 39.1*   MCV 85.6 86.2   * 92*     Recent Labs     03/30/22  1802 03/31/22  0454    142   K 4.5 4.1    109   CO2 19* 17*   BUN 19 19   CREATININE 1.9* 1.3     Recent Labs     03/30/22 1802 03/31/22  0454   * 183*   ALT 50* 63*   BILITOT 2.0* 1.2*   ALKPHOS 118 97     Recent Labs     03/30/22  1802   LIPASE 22.0     No results for input(s): PROTIME, INR in the last 72 hours.     Imaging  CT ABDOMEN PELVIS W IV CONTRAST Additional Contrast? None Final Result   No acute pathology in the abdomen and pelvis. No evidence of acute   appendicitis. ASSESSMENT AND RECOMMENDATIONS   47 y.o. male with a PMH of PMH of daily alcohol use who presented on 3/30/2022 with abdominal pain, nausea and vomiting. Work-up in the ED notable for elevated LFTs, elevated lactic acid, LAUREN. He was admitted and placed on IV fluids. We have been consulted regarding elevated LFTs      IMPRESSION:  1. Abnormal LFTs suspect secondary to alcohol hepatitis. AST >ALT. Will check PT/INR to calculate [facility] DF score, however suspect no indication for steroids at this time as bilirubin mildly elevated at 1.2. No asterixis on exam.  No evidence of cirrhosis or ductal dilation on CT. Will check right upper quadrant ultrasound. 2. Nausea, vomiting, abdominal pain. Suspect related to alcohol intoxication. He presented to the ED intoxicated. His symptoms have resolved today. CT showed no acute findings. LFTs elevated likely from alcohol but will check right upper quadrant ultrasound. 3. Alcohol abuse disorder. Encouraged alcohol abstinence        RECOMMENDATIONS:    Right upper quadrant ultrasound  PT/INR  Trend LFTs  Encourage alcohol abstinence    If you have any questions or need any further information, please feel free to contact our consult team.  Thank you for allowing us to participate in the care of Beto. The note was completed using Dragon voice recognition transcription. Every effort was made to ensure accuracy; however, inadvertent transcription errors may be present despite my best efforts to edit errors.       Zainab Gould PA-C

## 2022-03-31 NOTE — PROGRESS NOTES
Hospital Medicine Progress Note      Admit Date: 3/30/2022       CC: F/U for abd pain, n/v     HPI: Pt is an 47y.o. year-old male with a history of chronic daily alcohol abuse who presents to the emergency room for evaluation following an acute onset of abdominal pain, nausea and vomiting. He signed himself out of his nursing home today to go buy alcohol. Per ER reports he was drinking heavily today. In the emergency room he was found to be intoxicated. He had mild abdominal tenderness on exam.  He had acute renal failure and an elevated lactic acid level. He is being admitted for further evaluation and treatment. Associated signs and symptoms do not include fever or chills, diarrhea, melena, hematochezia, hematemesis, sweats, dark urine or jaundice.       Interval History/Subjective: cont IVF for elevated lactic acid. Cont to check LA daily until normalizes. Started librium to avoid withdrawal. States he does feel he is having some withdrawal symptoms. On CIWA     Patient continues to require aggressive hydration due to lactic acidosis. Improving. .patient with LAUREN on admission. Improving with hydration    Elevated LFTs. Cont to monitor. Can consult GI if worsens. Patient requires inpatient admission     Review of Systems:       The patient denied headaches, visual changes, LOC, SOB, CP, ABD pain, N/V/D, skin changes, new or worsening weakness or neuromuscular deficits. Comprehensive ROS negative except as mentioned above. Past Medical History:        Diagnosis Date    Alcohol abuse     Alcoholic cirrhosis of liver (HCC)     Anemia     Gastro-esophageal reflux disease with esophagitis     Hyperlipidemia     Thrombocytopenia (HCC)     Vitamin D deficiency        Past Surgical History:    History reviewed. No pertinent surgical history. Allergies: Other    Past medical and surgical history reviewed. Any changes have been noted.      PHYSICAL EXAM:  BP (!) 142/75   Pulse 80   Temp 98.9 °F (37.2 °C) (Oral)   Resp 18   Ht 6' 2\" (1.88 m)   Wt 212 lb 15.4 oz (96.6 kg)   SpO2 99%   BMI 27.34 kg/m²       Intake/Output Summary (Last 24 hours) at 3/31/2022 1055  Last data filed at 3/31/2022 0224  Gross per 24 hour   Intake 360 ml   Output 200 ml   Net 160 ml        General appearance:   No apparent distress, appears stated age. Cooperative. HEENT:  Normocephalic, atraumatic. PERRLA. EOMi. Conjunctivae/corneas clear, no icterus, non-injected. Neck: Supple, with full range of motion. No jugular venous distention. Trachea midline. Respiratory:  Normal respiratory effort. Clear to auscultation, bilaterally without Rales/Wheezes/Rhonchi. Cardiovascular:  Regular rate and rhythm without murmurs, rubs or gallops. Abdomen: Soft, non-tender, non-distended, without rebound or guarding. Normal bowel sounds. Musculoskeletal:  No clubbing, cyanosis or edema bilaterally. Full range of motion without deformity. Skin: Skin color, texture, turgor normal.  No rashes or lesions. Neurologic:  Neurovascularly intact without any focal sensory/motor deficits. Cranial nerves: II-XII intact, grossly intact. No facial asymmetry, tongue midline.    Psychiatric:  Alert and oriented, thought content appropriate  Capillary Refill: Brisk,< 3 seconds   Peripheral Pulses: +2 palpable, equal bilaterally       LABS:    Lab Results   Component Value Date    WBC 17.7 (H) 03/31/2022    HGB 13.1 (L) 03/31/2022    HCT 39.1 (L) 03/31/2022    MCV 86.2 03/31/2022    PLT 92 (L) 03/31/2022    LYMPHOPCT 4.5 03/31/2022    RBC 4.54 03/31/2022    MCH 28.8 03/31/2022    MCHC 33.4 03/31/2022    RDW 17.0 (H) 03/31/2022       Lab Results   Component Value Date    CREATININE 1.3 03/31/2022    BUN 19 03/31/2022     03/31/2022    K 4.1 03/31/2022     03/31/2022    CO2 17 (L) 03/31/2022       No results found for: MG    Lab Results   Component Value Date    ALT 63 (H) 03/31/2022     (H) 03/31/2022    ALKPHOS 97 03/31/2022 BILITOT 1.2 (H) 03/31/2022        No flowsheet data found. No results found for: LABA1C    Imaging:  CT ABDOMEN PELVIS W IV CONTRAST Additional Contrast? None   Final Result   No acute pathology in the abdomen and pelvis. No evidence of acute   appendicitis. Scheduled and prn Medications:    Scheduled Meds:   sodium chloride  1 g Oral Daily    NIFEdipine  30 mg Oral Daily    folic acid  1 mg Oral Daily    therapeutic multivitamin-minerals  1 tablet Oral Daily    thiamine mononitrate  100 mg Oral Daily    sodium chloride flush  10 mL IntraVENous 2 times per day    enoxaparin  40 mg SubCUTAneous Daily    pantoprazole  40 mg Oral BID AC     Continuous Infusions:   sodium chloride      sodium chloride 150 mL/hr at 03/31/22 0828     PRN Meds:.sodium chloride flush, sodium chloride, ondansetron, polyethylene glycol, acetaminophen **OR** acetaminophen, promethazine    Assessment & Plan:             Acute renal failure (ARF) ([facility] [address] -likely secondary to alcohol consumption and poor oral intake of foods and fluids. We will hydrate with isotonic saline and recheck renal function in the morning.     Chronic daily consumption of alcohol, current alcohol intoxication -  has been consultd to provide information for alcohol cessation. Will provide Thiamine, Folate and a Multivitamin. Pt will be monitored for withdrawal symptoms, and if necessary the CIWA protocol will be started to manage withdrawal.   - librium 10mg tid - can titrate down as able.  To avoid withdrawal  - CIWA  - Alcohol use increases the risk of Cancer (cancer sites linked to alcohol use include the mouth, pharynx (throat), larynx (voice box), esophagus, liver, breast, and colorectal region), Cardiovascular disease, Cirrhosis, Dementia, Depression, Seizures, Gout, Hypertension, Infectious disease, Nerve damage (including alcoholic neuropathy, muscle weakness, incontinence, constipation, erectile dysfunction, and other problems), Pancreatitis and more.      Abdominal pain - CT abdomen and pelvis with no acute pathology in the abdomen and pelvis. Alcoholic gastritis suspected with mild/moderate abdominal pain - will start bid Protonix bid and monitor symptoms       Non-intractable vomiting with nausea - Will provide symptomatic treatment with Zofran and/or Phenergan as needed, maintenance of fluids and electrolytes.     High anion gap metabolic acidosis/lactic acidosis, elevated Lactic Acid level - expect rapid improvement with administration of IV fluids and metabolism of alcohol. Will follow serial Lactic Acid levels. - Lactic acidosis occurs when ethanol metabolism results in a high hepatic NADH/NAD ratio, driving pyruvate metabolism towards lactate and inhibiting gluconeogenesis. The excess lactate is exported from the liver and appears as a lactic acidosis      Alcoholic cirrhosis ([address] with transaminitis and elevated bilirubin. Impaired synthetic function of the liver as evidenced by hypoalbuminemia -patient advised to stop drinking alcohol totally. We will monitor his LFTs while here, no acute issues or GI was consulted. He should follow-up as an outpatient.     Moderate protein-calorie malnutrition (hypoalbuminemia) - Patient encouraged to eat a balanced diet, including protein-rich foods. Will supply high protein, high calorie supplements       Continue current regimen/therapies. Monitor. Adjust medical regimen as appropriate. Body mass index is 27.34 kg/m². The patient and / or the family were informed of the results of any tests, a time was given to answer questions, a plan was proposed and they agreed with plan. DVT ppx: lovenox  GI ppx: protonix    Diet: ADULT DIET;  Regular  ADULT ORAL NUTRITION SUPPLEMENT; Lunch; Standard High Calorie/High Protein Oral Supplement    Consults:  IP CONSULT TO HOSPITALIST  CHEMICAL DEPENDENCY REFERRAL FOR SOCIAL SERVICE CONSULT    DISPO/placement plan: pending    Code Status: Full Code      Galina Fernandes, ALEAH - CONI  03/31/22

## 2022-03-31 NOTE — PLAN OF CARE
Problem: Pain:  Goal: Pain level will decrease  Description: Pain level will decrease  3/31/2022 1612 by Meg Chance RN  Outcome: Ongoing

## 2022-03-31 NOTE — PLAN OF CARE
Problem: Nutrition  Goal: Optimal nutrition therapy  Outcome: Ongoing     Nutrition Problem #1: Inadequate oral intake  Intervention: Food and/or Nutrient Delivery: Continue Current Diet,Discontinue Oral Nutrition Supplement  Nutritional Goals: Consume greater than 50% of meals this admission

## 2022-03-31 NOTE — PROGRESS NOTES
Comprehensive Nutrition Assessment    Type and Reason for Visit:  Initial,Positive Nutrition Screen    Nutrition Recommendations/Plan:   Regular diet   Pt not accepting ONS, will d/c     Nutrition Assessment:  + Screen for wounds. Pt presented with abdominal pain, n/v.  Being treated for ARF. Hx includes etoh abuse, cirrhosis. Pt on Regular diet, reports eating better currently. Appetite decreased PTA d/t acute illness. No wt change. ONS ordered, pt does not like. Will d/c. Malnutrition Assessment:  Malnutrition Status:  No malnutrition    Context:  Acute Illness     Findings of the 6 clinical characteristics of malnutrition:  Energy Intake:  Mild decrease in energy intake (Comment)  Weight Loss:  No significant weight loss     Body Fat Loss:  No significant body fat loss     Muscle Mass Loss:  No significant muscle mass loss    Fluid Accumulation:  No significant fluid accumulation     Strength:  Not Performed    Estimated Daily Nutrient Needs:  Energy (kcal):  9200-3438 kcal (20-25 kcal/kg ABW); Weight Used for Energy Requirements:  Current     Protein (g):   gm (1-1.2 gm/kg ABW); Weight Used for Protein Requirements:  Current        Fluid (ml/day):   ; Method Used for Fluid Requirements:  1 ml/kcal      Nutrition Related Findings:  Reviewed labs      Wounds:  None (blister to foot)    Current Nutrition Therapies:    ADULT DIET; Regular  ADULT ORAL NUTRITION SUPPLEMENT; Lunch; Standard High Calorie/High Protein Oral Supplement    Anthropometric Measures:  · Height: 6' 2\" (188 cm)  · Current Body Weight: 212 lb (96.2 kg)   · Admission Body Weight: 218 lb (98.9 kg)     · Ideal Body Weight: 190 lbs; % Ideal Body Weight 111.6 %   · BMI: 27.2  · Adjusted Body Weight:  ; No Adjustment   · BMI Categories: Overweight (BMI 25.0-29. 9)       Nutrition Diagnosis:   · Inadequate oral intake related to altered GI function (n/v, abdominal pain) as evidenced by poor intake prior to admission      Nutrition Interventions:   Food and/or Nutrient Delivery:  Continue Current Diet,Discontinue Oral Nutrition Supplement  Nutrition Education/Counseling:  No recommendation at this time   Coordination of Nutrition Care:  Continue to monitor while inpatient    Goals:  Consume greater than 50% of meals this admission       Nutrition Monitoring and Evaluation:   Behavioral-Environmental Outcomes:  None Identified   Food/Nutrient Intake Outcomes:  Food and Nutrient Intake,Supplement Intake  Physical Signs/Symptoms Outcomes:  Biochemical Data,GI Status,Nutrition Focused Physical Findings,Skin,Weight     Discharge Planning:    No discharge needs at this time     Electronically signed by Isabella Morales RD, LD on 3/31/22 at 2:39 PM EDT    Contact: [phone]

## 2022-03-31 NOTE — PROGRESS NOTES
Medication Reconciliation     List of medications patient is currently taking is complete. Source of information:   1. Medication list from [facility]  2.  EPIC records        Vaishali Deras Pharmacy Intern 3/30/2022 10:17 PM

## 2022-03-31 NOTE — H&P
Hospital Medicine  History and Physical    PCP: Tyra Romero MD  Patient Name: Ha Fox    Date of Service: Pt seen/examined on 3/30/22 and admitted to Inpatient with expected LOS greater than two midnights due to medical therapy      CHIEF COMPLAINT:  Pt c/o abdominal pain, nausea and vomiting  HISTORY OF PRESENT ILLNESS: Pt is an 47y.o. year-old male with a history of chronic daily alcohol abuse who presents to the emergency room for evaluation following an acute onset of abdominal pain, nausea and vomiting. He signed himself out of his nursing home today to go by alcohol. Per ER reports he was drinking heavily today. In the emergency room he was found to be intoxicated. He had mild abdominal tenderness on exam.  He had acute renal failure and an elevated lactic acid level. He is being admitted for further evaluation and treatment. Associated signs and symptoms do not include fever or chills, diarrhea, melena, hematochezia, hematemesis, sweats, dark urine or jaundice. Past Medical History:        Diagnosis Date    Alcohol abuse     Alcoholic cirrhosis of liver (HCC)     Anemia     Gastro-esophageal reflux disease with esophagitis     Hyperlipidemia     Thrombocytopenia (HCC)     Vitamin D deficiency        Past Surgical History:    History reviewed. No pertinent surgical history. Allergies: Other    Medications Prior to Admission:    Prior to Admission medications    Medication Sig Start Date End Date Taking?  Authorizing Provider   acetaminophen (TYLENOL) 325 MG tablet Take 650 mg by mouth every 4 hours as needed for Pain   Yes Historical Provider, MD   Cholecalciferol 1.25 MG (00150 UT) TABS Take 1 tablet by mouth once a week Given every saturday   Yes Historical Provider, MD   folic acid (FOLVITE) 1 MG tablet Take 1 mg by mouth daily   Yes Historical Provider, MD   magnesium hydroxide (MILK OF MAGNESIA) 400 MG/5ML suspension Take 30 mLs by mouth daily as needed for Constipation Yes Historical Provider, MD   NIFEdipine (ADALAT CC) 30 MG extended release tablet Take 30 mg by mouth daily   Yes Historical Provider, MD   polyethylene glycol (GLYCOLAX) 17 g packet Take 17 g by mouth daily as needed for Constipation   Yes Historical Provider, MD   senna (SENOKOT) 8.6 MG tablet Take 1 tablet by mouth 2 times daily   Yes Historical Provider, MD   tiZANidine (ZANAFLEX) 4 MG tablet Take 4 mg by mouth every 6 hours as needed   Yes Historical Provider, MD   sodium chloride 1 g tablet Take 1 g by mouth daily   Yes Historical Provider, MD       Family History:   Family history is negative for accelerated coronary artery disease, diabetes or malignancies. Social History:   TOBACCO:   reports that he has never smoked. He has never used smokeless tobacco.  ETOH:   reports current alcohol use. OCCUPATION:      REVIEW OF SYSTEMS:  A full review of systems was performed and is negative except for that which appears in the HPI    Physical Exam:    Vitals: /81   Pulse 95   Temp 102.8 °F (39.3 °C) (Oral) Comment: team will be notified  Resp 15   Ht 6' 2\" (1.88 m)   Wt 218 lb 11.1 oz (99.2 kg)   SpO2 97%   BMI 28.08 kg/m²   General appearance: WD/WN 47y.o. year-old male who is alert, appears stated age and is cooperative  HEENT: Head: Normocephalic, no lesions, without obvious abnormality. Eye: Normal external eye, conjunctiva, lids cornea, PEERL. Ears: Normal external ears. Non-tender. Nose: Normal external nose, mucus membranes and septum. Pharynx: Dental Hygiene adequate. Normal buccal mucosa. Normal pharynx.   Neck: no adenopathy, no carotid bruit, no JVD, supple, symmetrical, trachea midline and thyroid not enlarged, symmetric, no tenderness/mass/nodules  Lungs: clear to auscultation bilaterally and no use of accessory muscles  Heart: regular rate and rhythm, S1, S2 normal, no murmur, click, rub or gallop and normal apical impulse  Abdomen: soft, non-tender; bowel sounds normal; no masses, no organomegaly  Extremities: extremities atraumatic, no cyanosis or edema and Homans sign is negative, no sign of DVT. Capillary Refill: Acceptable < 3 seconds   Peripheral Pulses: +3 easily felt, not easily obliterated with pressures   Skin: Skin color, texture, turgor normal. No rashes or lesions on exposed skin  Neurologic: Neurovascularly intact without any focal sensory/motor deficits. Cranial nerves: II-XII intact, grossly non-focal. Gait was not tested. Mental Status: Alert and oriented, thought content appropriate, normal insight    CBC:   Recent Labs     03/30/22  1802   WBC 11.0   HGB 13.5   *     BMP:    Recent Labs     03/30/22  1802      K 4.5      CO2 19*   BUN 19   CREATININE 1.9*   GLUCOSE 105*     Troponin: No results for input(s): TROPONINI in the last 72 hours. PT/INR:  No results found for: PTINR  U/A:  No results found for: LEUKOCYTESUR, NITRITE, RBCUA, SPECGRAV, [address], BILIRUBINUR, BLOODU, GLUCOSEU, [address]      RAD:   I have independently reviewed and interpreted the imaging studies below and based my recommendations to the patient on those findings. CT ABDOMEN PELVIS W IV CONTRAST Additional Contrast? None    Result Date: 3/30/2022  EXAMINATION: CT OF THE ABDOMEN AND PELVIS WITH CONTRAST 3/30/2022 6:41 pm TECHNIQUE: CT of the abdomen and pelvis was performed with the administration of intravenous contrast. Multiplanar reformatted images are provided for review. Dose modulation, iterative reconstruction, and/or weight based adjustment of the mA/kV was utilized to reduce the radiation dose to as low as reasonably achievable. COMPARISON: None.  HISTORY: ORDERING SYSTEM PROVIDED HISTORY: RLQ pain TECHNOLOGIST PROVIDED HISTORY: Additional Contrast?->None Reason for exam:->RLQ pain Decision Support Exception - unselect if not a suspected or confirmed emergency medical condition->Emergency Medical Condition (MA) Reason for Exam: RLQ pain FINDINGS: Lower Chest:  Visualized portion of the lower chest demonstrates no acute abnormality. Organs:  Liver enhances normally without evidence of intrahepatic biliary ductal dilatation. The gallbladder is unremarkable. The spleen, pancreas and adrenal glands are unremarkable. The kidneys enhance symmetrically without evidence of hydronephrosis. GI/Bowel: Small hiatal hernia. There is no evidence of bowel obstruction. No evidence of abnormal bowel wall thickening or distension. The appendix is visualized and is unremarkable. No evidence of acute appendicitis. Pelvis:  Bladder is unremarkable in appearance. No acute abnormality of the prostate. Peritoneum/Retroperitoneum: No evidence of ascites or free air. No evidence of lymphadenopathy. Aorta is normal in caliber. Bones/Soft Tissues:  No acute abnormality of the visualized osseous structures. Visualized soft tissues are unremarkable. No acute pathology in the abdomen and pelvis. No evidence of acute appendicitis. Assessment:   Principal Problem:    Acute renal failure (ARF) (HCC)  Active Problems:    Lactic acidosis    Alcoholic cirrhosis (HCC)    Abdominal pain    Nausea and vomiting    Elevated lactic acid level    High anion gap metabolic acidosis    Hypoalbuminemia    Transaminitis    Elevated bilirubin    Moderate protein-calorie malnutrition (HCC)    Alcohol intoxication ([address]  Resolved Problems:    * No resolved hospital problems. *      Plan:       Acute renal failure (ARF) ([address] -likely secondary to alcohol consumption and poor oral intake of foods and fluids. We will hydrate with isotonic saline and recheck renal function in the morning. Chronic daily consumption of alcohol, current alcohol intoxication -  has been consultd to provide information for alcohol cessation. Will provide Thiamine, Folate and a Multivitamin.  Pt will be monitored for withdrawal symptoms, and if necessary the CIWA protocol will be started to manage withdrawal.   - Alcohol use increases the risk of Cancer (cancer sites linked to alcohol use include the mouth, pharynx (throat), larynx (voice box), esophagus, liver, breast, and colorectal region), Cardiovascular disease, Cirrhosis, Dementia, Depression, Seizures, Gout, Hypertension, Infectious disease, Nerve damage (including alcoholic neuropathy, muscle weakness, incontinence, constipation, erectile dysfunction, and other problems), Pancreatitis and more. Abdominal pain - CT abdomen and pelvis with no acute pathology in the abdomen and pelvis. Alcoholic gastritis suspected with mild/moderate abdominal pain - will start bid Protonix bid and monitor symptoms    Non-intractable vomiting with nausea - Will provide symptomatic treatment with Zofran and/or Phenergan as needed, maintenance of fluids and electrolytes. High anion gap metabolic acidosis/lactic acidosis, elevated Lactic Acid level - expect rapid improvement with administration of IV fluids and metabolism of alcohol. Will follow serial Lactic Acid levels. - Lactic acidosis occurs when ethanol metabolism results in a high hepatic NADH/NAD ratio, driving pyruvate metabolism towards lactate and inhibiting gluconeogenesis. The excess lactate is exported from the liver and appears as a lactic acidosis     Alcoholic cirrhosis (HCC) with transaminitis and elevated bilirubin. Impaired synthetic function of the liver as evidenced by hypoalbuminemia -patient advised to stop drinking alcohol totally. We will monitor his LFTs while here, no acute issues or GI was not consulted. He should follow-up as an outpatient. Moderate protein-calorie malnutrition (hypoalbuminemia) - Patient encouraged to eat a balanced diet, including protein-rich foods. Will supply high protein, high calorie supplements            DVT Prophylaxis: Lovenox  Diet: ADULT DIET;  Regular  ADULT ORAL NUTRITION SUPPLEMENT; Lunch; Standard High Calorie/High Protein Oral Supplement  Code Status: Full Code  (Advanced care planning has been discussed with patient and/or responsible family member and is reflected in the code status.  Further orders associated with this have been entered if appropriate)    Disposition: Anticipate that patient will remain in the hospital for 2 to 3 days depending on further evaluation and clinical course    Please note that over 50 minutes was spent in evaluating the patient, review of records and results, discussion with staff/family, etc.    Kenneth De La Paz MD

## 2022-03-31 NOTE — PROGRESS NOTES
Pt admitted to 4W from ED. Vitals obtained on arrival. Temperature read febrile at a 102.8. Acetaminophen was administered. Team notified. Last temp read 98.9 orally. Admission and Head to toe assessment complete. 1 episode of emesis occurred on unit. Output recorded. Team aware. Bed rails padded for seizure precautions, based on admission DX. Bed at lowest level and call light in reach.   Abhijeet Abdul RN

## 2022-03-31 NOTE — CARE COORDINATION
Patient came from [facility] prior to arrival.  Call to ,[phone] , at [facility] who confirmed the patient is:  [] [address], no Precert required for return.  [] [address] [facility] will be required for return. [] Skilled Nursing Care, no Precert required for return. [] [address] required for return.    [] Is fully vaccinated for Covid. [] Has started Covid vaccination, but is not complete. [] Is not vaccinated for Covid. [] No Covid Test needed for return.  [] Rapid Covid test needed before return within: [] 48 hours, [] 72 hours, [] 5 days, [] other (***)  [] PCR Covid test needed before return.    [] Confirmed with the patient or family that the plan is to return to this facility at D/C. [] Patient and/or designated decision maker does not plan for the patient to return to this facility at D/C.      Electronically signed by Robina Power RN on 3/31/2022 at 2:05 PM

## 2022-03-31 NOTE — CARE COORDINATION
3/31 Patient is from [facility]. Call placed to Jose Shay [phone] at [facility] (/[facility]). [place] and message left for return call.  Electronically signed by Mila Onofre RN on 3/31/2022 at 3:51 PM

## 2022-04-01 VITALS
TEMPERATURE: 98.2 F | HEART RATE: 120 BPM | BODY MASS INDEX: 27.33 KG/M2 | HEIGHT: 74 IN | SYSTOLIC BLOOD PRESSURE: 132 MMHG | WEIGHT: 212.96 LBS | OXYGEN SATURATION: 95 % | RESPIRATION RATE: 18 BRPM | DIASTOLIC BLOOD PRESSURE: 72 MMHG

## 2022-04-01 LAB
A/G RATIO: 0.6 (ref 1.1–2.2)
ALBUMIN SERPL-MCNC: 2.7 G/DL (ref 3.4–5)
ALP BLD-CCNC: 100 U/L (ref 40–129)
ALT SERPL-CCNC: 49 U/L (ref 10–40)
ANION GAP SERPL CALCULATED.3IONS-SCNC: 10 MMOL/L (ref 3–16)
AST SERPL-CCNC: 88 U/L (ref 15–37)
BASOPHILS ABSOLUTE: 0 K/UL (ref 0–0.2)
BASOPHILS RELATIVE PERCENT: 0.1 %
BILIRUB SERPL-MCNC: 1.3 MG/DL (ref 0–1)
BILIRUBIN DIRECT: 0.5 MG/DL (ref 0–0.3)
BUN BLDV-MCNC: 7 MG/DL (ref 7–20)
CALCIUM SERPL-MCNC: 8.3 MG/DL (ref 8.3–10.6)
CHLORIDE BLD-SCNC: 107 MMOL/L (ref 99–110)
CO2: 22 MMOL/L (ref 21–32)
CREAT SERPL-MCNC: 0.7 MG/DL (ref 0.9–1.3)
EOSINOPHILS ABSOLUTE: 0 K/UL (ref 0–0.6)
EOSINOPHILS RELATIVE PERCENT: 0.3 %
GFR AFRICAN AMERICAN: >60
GFR NON-AFRICAN AMERICAN: >60
GLUCOSE BLD-MCNC: 85 MG/DL (ref 70–99)
HCT VFR BLD CALC: 38.2 % (ref 40.5–52.5)
HEMOGLOBIN: 12.7 G/DL (ref 13.5–17.5)
LACTIC ACID: 0.9 MMOL/L (ref 0.4–2)
LACTIC ACID: 2.1 MMOL/L (ref 0.4–2)
LYMPHOCYTES ABSOLUTE: 1.2 K/UL (ref 1–5.1)
LYMPHOCYTES RELATIVE PERCENT: 10.8 %
MAGNESIUM: 1.6 MG/DL (ref 1.8–2.4)
MCH RBC QN AUTO: 28.6 PG (ref 26–34)
MCHC RBC AUTO-ENTMCNC: 33.1 G/DL (ref 31–36)
MCV RBC AUTO: 86.3 FL (ref 80–100)
MONOCYTES ABSOLUTE: 0.5 K/UL (ref 0–1.3)
MONOCYTES RELATIVE PERCENT: 4.7 %
NEUTROPHILS ABSOLUTE: 9.1 K/UL (ref 1.7–7.7)
NEUTROPHILS RELATIVE PERCENT: 84.1 %
PDW BLD-RTO: 16.9 % (ref 12.4–15.4)
PLATELET # BLD: 90 K/UL (ref 135–450)
PMV BLD AUTO: 7.9 FL (ref 5–10.5)
POTASSIUM REFLEX MAGNESIUM: 3.5 MMOL/L (ref 3.5–5.1)
RBC # BLD: 4.42 M/UL (ref 4.2–5.9)
SODIUM BLD-SCNC: 139 MMOL/L (ref 136–145)
TOTAL PROTEIN: 7 G/DL (ref 6.4–8.2)
WBC # BLD: 10.9 K/UL (ref 4–11)

## 2022-04-01 PROCEDURE — 6360000002 HC RX W HCPCS: Performed by: INTERNAL MEDICINE

## 2022-04-01 PROCEDURE — 85025 COMPLETE CBC W/AUTO DIFF WBC: CPT

## 2022-04-01 PROCEDURE — 83735 ASSAY OF MAGNESIUM: CPT

## 2022-04-01 PROCEDURE — 83605 ASSAY OF LACTIC ACID: CPT

## 2022-04-01 PROCEDURE — 2580000003 HC RX 258: Performed by: NURSE PRACTITIONER

## 2022-04-01 PROCEDURE — 6360000002 HC RX W HCPCS: Performed by: NURSE PRACTITIONER

## 2022-04-01 PROCEDURE — 36415 COLL VENOUS BLD VENIPUNCTURE: CPT

## 2022-04-01 PROCEDURE — 6370000000 HC RX 637 (ALT 250 FOR IP): Performed by: NURSE PRACTITIONER

## 2022-04-01 PROCEDURE — 6370000000 HC RX 637 (ALT 250 FOR IP): Performed by: INTERNAL MEDICINE

## 2022-04-01 PROCEDURE — 82248 BILIRUBIN DIRECT: CPT

## 2022-04-01 PROCEDURE — 80053 COMPREHEN METABOLIC PANEL: CPT

## 2022-04-01 PROCEDURE — 93976 VASCULAR STUDY: CPT

## 2022-04-01 RX ORDER — MAGNESIUM SULFATE 1 G/100ML
1000 INJECTION INTRAVENOUS ONCE
Status: COMPLETED | OUTPATIENT
Start: 2022-04-01 | End: 2022-04-01

## 2022-04-01 RX ORDER — THIAMINE MONONITRATE (VIT B1) 100 MG
100 TABLET ORAL DAILY
Qty: 30 TABLET | Refills: 1 | Status: SHIPPED | OUTPATIENT
Start: 2022-04-02

## 2022-04-01 RX ORDER — M-VIT,TX,IRON,MINS/CALC/FOLIC 27MG-0.4MG
1 TABLET ORAL DAILY
Qty: 30 TABLET | Refills: 0 | Status: SHIPPED | OUTPATIENT
Start: 2022-04-02

## 2022-04-01 RX ORDER — PANTOPRAZOLE SODIUM 40 MG/1
40 TABLET, DELAYED RELEASE ORAL
Qty: 30 TABLET | Refills: 3 | Status: SHIPPED | OUTPATIENT
Start: 2022-04-01

## 2022-04-01 RX ORDER — FOLIC ACID 1 MG/1
1 TABLET ORAL DAILY
Qty: 30 TABLET | Refills: 0 | Status: SHIPPED | OUTPATIENT
Start: 2022-04-01

## 2022-04-01 RX ADMIN — CHLORDIAZEPOXIDE HYDROCHLORIDE 10 MG: 5 CAPSULE ORAL at 20:35

## 2022-04-01 RX ADMIN — NIFEDIPINE 30 MG: 30 TABLET, FILM COATED, EXTENDED RELEASE ORAL at 09:32

## 2022-04-01 RX ADMIN — Medication 1 TABLET: at 09:32

## 2022-04-01 RX ADMIN — Medication 1 G: at 09:32

## 2022-04-01 RX ADMIN — THIAMINE HCL TAB 100 MG 100 MG: 100 TAB at 09:32

## 2022-04-01 RX ADMIN — ENOXAPARIN SODIUM 40 MG: 40 INJECTION SUBCUTANEOUS at 09:32

## 2022-04-01 RX ADMIN — FOLIC ACID 1 MG: 1 TABLET ORAL at 09:32

## 2022-04-01 RX ADMIN — PANTOPRAZOLE SODIUM 40 MG: 40 TABLET, DELAYED RELEASE ORAL at 05:14

## 2022-04-01 RX ADMIN — CHLORDIAZEPOXIDE HYDROCHLORIDE 10 MG: 5 CAPSULE ORAL at 13:19

## 2022-04-01 RX ADMIN — CHLORDIAZEPOXIDE HYDROCHLORIDE 10 MG: 5 CAPSULE ORAL at 09:32

## 2022-04-01 RX ADMIN — MAGNESIUM SULFATE HEPTAHYDRATE 1000 MG: 1 INJECTION, SOLUTION INTRAVENOUS at 16:01

## 2022-04-01 RX ADMIN — SODIUM CHLORIDE: 9 INJECTION, SOLUTION INTRAVENOUS at 03:25

## 2022-04-01 RX ADMIN — SODIUM CHLORIDE: 9 INJECTION, SOLUTION INTRAVENOUS at 13:20

## 2022-04-01 RX ADMIN — PANTOPRAZOLE SODIUM 40 MG: 40 TABLET, DELAYED RELEASE ORAL at 16:01

## 2022-04-01 ASSESSMENT — PAIN SCALES - GENERAL: PAINLEVEL_OUTOF10: 0

## 2022-04-01 NOTE — CARE COORDINATION
Discharge Planning:  DEMETRIUS spoke with Bobbi Ann at [facility] [phone]. Bobbi Ann confirmed that this pt is a long term resident at this facility and will be able to return upon d/c.  Bobbi Ann is requesting a COVID test within 48hrs of d/c. No pre cert is needed and pt will not need another HENS. Bobbi Ann confirmed that this pt does not have a POA. Plan: Return to [facility] upon d/c. No HENS needed.   Pt will need a COVID test within 48hrs of d/c.  MAGAN Foley  [phone]  Electronically signed by Lidia Richardson on 4/1/2022 at 9:30 AM

## 2022-04-01 NOTE — DISCHARGE SUMMARY
Hospital Medicine Discharge Summary    Patient ID: Jessa Rubio      Patient's PCP: Jessica Silva MD    Admit Date: 3/30/2022     Discharge Date:   4/1/22    Admitting Physician: Deedee Caldwell MD     Discharge Physician: ALEAH Lowery - CNP       Discharge Diagnoses: Active Hospital Problems    Diagnosis Date Noted    Alcoholic cirrhosis ([facility] [address] [D67.06] 03/31/2022    Abdominal pain [R10.9] 03/31/2022    Nausea and vomiting [R11.2] 03/31/2022    Elevated lactic acid level [R79.89] 03/31/2022    High anion gap metabolic acidosis [N67.9] 03/31/2022    Acute renal failure (ARF) ([facility] [address] [N17.9] 03/31/2022    Hypoalbuminemia [E88.09] 03/31/2022    Transaminitis [R74.01] 03/31/2022    Elevated bilirubin [R17] 03/31/2022    Moderate protein-calorie malnutrition ([facility] [address] [E44.0] 03/31/2022    Alcohol intoxication ([facility] [address] [F10.929] 03/31/2022    Lactic acidosis [E87.2] 03/30/2022       The patient was seen and examined on day of discharge and this discharge summary is in conjunction with any daily progress note from day of discharge. Disposition:  [] Home  [] Home with home health [] Rehab [] Psych [] SNF  [] LTAC  [x] Long term nursing home or group home [] Transfer to ICU  [] Transfer to PCU [] Other:    Hospital Course:  Pt is an 47 y. o. year-old male with a history of chronic daily alcohol abuse who presents to the emergency room for evaluation following an acute onset of abdominal pain, nausea and vomiting.  He signed himself out of his nursing home today to go buy alcohol.  Per ER reports he was drinking heavily today.  In the emergency room he was found to be intoxicated. [facility] had mild abdominal tenderness on exam. [facility] had acute renal failure and an elevated lactic acid level.  He is being admitted for further evaluation and treatment.  Associated signs and symptoms do not include fever or chills, diarrhea, melena, hematochezia, hematemesis, sweats, dark urine or jaundice.        3/31: cont IVF for elevated lactic acid. Cont to check LA daily until normalizes.  Started librium to avoid withdrawal. States he does feel he is having some withdrawal symptoms. On CIWA      Patient continues to require aggressive hydration due to lactic acidosis. Improving. .patient with LAUREN on admission. Improving with hydration     Elevated LFTs. Cont to monitor. Can consult GI if worsens. Patient requires inpatient admission         Interval History/Subjective: lactic acid normalized. Patient states he is feeling better. No further signs of withdrawal. Cont IVF liter then can stop IVF. He is eating and drinking fine. He can go after magnesium given. Filled Rx to retail pharmacy for vitamins, folic acid and thiamine. Acute renal failure  -likely secondary to alcohol consumption and poor oral intake of foods and fluids.  We will hydrate with isotonic saline and recheck renal function in the morning.     Chronic daily consumption of alcohol, current alcohol intoxication -  has been consultd to provide information for alcohol cessation. Will provide Thiamine, Folate and a Multivitamin. Pt will be monitored for withdrawal symptoms, and if necessary the CIWA protocol will be started to manage withdrawal.   - librium 10mg tid - can titrate down as able.  To avoid withdrawal  - CIWA  - Alcohol use increases the risk of Cancer (cancer sites linked to alcohol use include the mouth, pharynx (throat), larynx (voice box), esophagus, liver, breast, and colorectal region), Cardiovascular disease, Cirrhosis, Dementia, Depression, Seizures, Gout, Hypertension, Infectious disease, Nerve damage (including alcoholic neuropathy, muscle weakness, incontinence, constipation, erectile dysfunction, and other problems), Pancreatitis and more.      Abdominal pain   - CT abdomen and pelvis with no acute pathology in the abdomen and pelvis.   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 Will follow serial Lactic Acid levels. - Lactic acidosis occurs when ethanol metabolism results in a high hepatic NADH/NAD ratio, driving pyruvate metabolism towards lactate and inhibiting gluconeogenesis. The excess lactate is exported from the liver and appears as a lactic acidosis      Alcoholic hepatitis with transaminitis   -patient advised to stop drinking alcohol totally.    We will monitor his LFTs while here,   -  GI was consulted. - no indication for steroids at this time. No asterixs. , no cirrhosis on CT  - RUQ - hepatic steatosis  - ok for discharge; f/u 2-3 wks for fibroscan, repeat liver chemistries.      Moderate protein-calorie malnutrition (hypoalbuminemia) - Patient encouraged to eat a balanced diet, including protein-rich foods. Will supply high protein, high calorie supplements    Exam:     /72   Pulse 72   Temp 98.5 °F (36.9 °C) (Oral)   Resp 16   Ht 6' 2\" (1.88 m)   Wt 212 lb 15.4 oz (96.6 kg)   SpO2 97%   BMI 27.34 kg/m²   General appearance: No apparent distress, appears stated age and cooperative. HEENT: Pupils equal, round, and reactive to light. Conjunctivae/corneas clear. Neck: Supple, with full range of motion. No jugular venous distention. Trachea midline. Respiratory:  Normal respiratory effort. Clear to auscultation, bilaterally without Rales/Wheezes/Rhonchi. Cardiovascular: Regular rate and rhythm with normal S1/S2 without murmurs, rubs or gallops. Abdomen: Soft, non-tender, non-distended with normal bowel sounds. Musculoskelatal: No clubbing, cyanosis or edema bilaterally.   Full range of motion without deformity. Skin: Skin color, texture, turgor normal.  No rashes or lesions. Neurologic:  Neurovascularly intact without any focal sensory/motor deficits. Cranial nerves: II-XII intact, grossly non-focal.  Psychiatric: Alert and oriented, thought content appropriate, normal insight      Consults:     IP CONSULT TO HOSPITALIST  CHEMICAL DEPENDENCY REFERRAL FOR SOCIAL SERVICE CONSULT  IP CONSULT TO GI    Diagnostic tests:   Imaging:  VL AORTA/IVC/ILIAC/BYPASS GRAFT LIMITED           US ABDOMEN LIMITED Specify organ? LIVER, GALLBLADDER   Preliminary Result   Hepatic steatosis with subtle/early cirrhotic changes suspected.           CT ABDOMEN PELVIS W IV CONTRAST Additional Contrast? None   Final Result   No acute pathology in the abdomen and pelvis. No evidence of acute   appendicitis.                 Labs:  For convenience and continuity at follow-up the following most recent labs are provided:      CBC:    Lab Results   Component Value Date    WBC 10.9 04/01/2022    HGB 12.7 04/01/2022    HCT 38.2 04/01/2022    PLT 90 04/01/2022       Renal:    Lab Results   Component Value Date     04/01/2022    K 3.5 04/01/2022     04/01/2022    CO2 22 04/01/2022    BUN 7 04/01/2022    CREATININE 0.7 04/01/2022    CALCIUM 8.3 04/01/2022           Discharge Instructions/Follow-up:  F/u GI 2-3 wks, Dr. Rip Calvin for fibroscan, repeat liver chems  stop drinking alcohol,     PCP/SNF to follow up: pcp 1 wk     D/C condition: stable    Code status: full    Activity: ad penny    Diet: general      Discharge Medications:     Current Discharge Medication List           Details   Multiple Vitamins-Minerals (THERAPEUTIC MULTIVITAMIN-MINERALS) tablet Take 1 tablet by mouth daily  Qty: 30 tablet, Refills: 0      pantoprazole (PROTONIX) 40 MG tablet Take 1 tablet by mouth 2 times daily (before meals)  Qty: 30 tablet, Refills: 3      thiamine mononitrate (THIAMINE) 100 MG tablet Take 1 tablet by mouth daily  Qty: 30 tablet, Refills: 1 Details   folic acid (FOLVITE) 1 MG tablet Take 1 tablet by mouth daily  Qty: 30 tablet, Refills: 0              Details   acetaminophen (TYLENOL) 325 MG tablet Take 650 mg by mouth every 4 hours as needed for Pain      Cholecalciferol 1.25 MG (19292 UT) TABS Take 1 tablet by mouth once a week Given every saturday      magnesium hydroxide (MILK OF MAGNESIA) 400 MG/5ML suspension Take 30 mLs by mouth daily as needed for Constipation      NIFEdipine (ADALAT CC) 30 MG extended release tablet Take 30 mg by mouth daily      polyethylene glycol (GLYCOLAX) 17 g packet Take 17 g by mouth daily as needed for Constipation      senna (SENOKOT) 8.6 MG tablet Take 1 tablet by mouth 2 times daily      tiZANidine (ZANAFLEX) 4 MG tablet Take 4 mg by mouth every 6 hours as needed      sodium chloride 1 g tablet Take 1 g by mouth daily             Time Spent on discharge is more than 30 minutes in the examination, evaluation, counseling and review of medications and discharge plan. Signed:    ALEAH Candelario - CNP   4/1/2022      Thank you Marcos Oneill MD for the opportunity to be involved in this patient's care. If you have any questions or concerns please feel free to contact me at [phone].

## 2022-04-01 NOTE — PROGRESS NOTES
INPATIENT PROGRESS NOTE        IDENTIFYING DATA/REASON FOR CONSULTATION   PATIENT:  Mesfin Saeed  MRN:  [id number]  ADMIT DATE: 3/30/2022  TIME OF EVALUATION: 2022 10:53 AM  HOSPITAL STAY:   LOS: 2 days   CONSULTING PHYSICIAN: Steffani Peabody, MD   REASON FOR CONSULTATION: elevated LFTs    Subjective:    Patient seen in follow up. He has no complaints. Denies abd pain, nausea or vomiting. Tolerating food. MEDICATIONS   SCHEDULED:  sodium chloride, 1 g, Daily  NIFEdipine, 30 mg, Daily  folic acid, 1 mg, Daily  therapeutic multivitamin-minerals, 1 tablet, Daily  thiamine mononitrate, 100 mg, Daily  sodium chloride flush, 10 mL, 2 times per day  enoxaparin, 40 mg, Daily  pantoprazole, 40 mg, BID AC  chlordiazePOXIDE, 10 mg, TID      FLUIDS/DRIPS:     sodium chloride      sodium chloride 100 mL/hr at 22 9870     PRNs: sodium chloride flush, 10 mL, PRN  sodium chloride, , PRN  ondansetron, 4 mg, Q4H PRN  polyethylene glycol, 17 g, Daily PRN  acetaminophen, 650 mg, Q4H PRN   Or  acetaminophen, 650 mg, Q4H PRN  promethazine, 25 mg, Q4H PRN      ALLERGIES:    Allergies   Allergen Reactions    Other      Bleach         PHYSICAL EXAM   VITALS:  /82   Pulse 85   Temp 98.2 °F (36.8 °C) (Oral)   Resp 20   Ht 6' 2\" (1.88 m)   Wt 212 lb 15.4 oz (96.6 kg)   SpO2 99%   BMI 27.34 kg/m²   TEMPERATURE:  Current - Temp: 98.2 °F (36.8 °C); Max - Temp  Av.7 °F (37.1 °C)  Min: 98.2 °F (36.8 °C)  Max: 99 °F (37.2 °C)    Physical Exam:  General appearance: alert, cooperative, no distress, appears stated age  Eyes: Anicteric  Head: Normocephalic, without obvious abnormality  Lungs: clear to auscultation bilaterally, Normal Effort  Heart: regular rate and rhythm, normal S1 and S2, no murmurs or rubs  Abdomen: soft, non-distended, non-tender.  Bowel sounds normal.   Extremities: atraumatic, no cyanosis or edema  Skin: warm and dry, no jaundice  Neuro: Grossly intact, A&OX3, no asterixis on exam    LABS AND IMAGING   Laboratory   Recent Labs     03/30/22  1802 03/31/22  0454 04/01/22  0716   WBC 11.0 17.7* 10.9   HGB 13.5 13.1* 12.7*   HCT 40.5 39.1* 38.2*   MCV 85.6 86.2 86.3   * 92* 90*     Recent Labs     03/30/22  1802 03/31/22  0454 04/01/22  0716    142 139   K 4.5 4.1 3.5    109 107   CO2 19* 17* 22   BUN 19 19 7   CREATININE 1.9* 1.3 0.7*     Recent Labs     03/30/22  1802 03/31/22  0454 04/01/22  0716   * 183* 88*   ALT 50* 63* 49*   BILITOT 2.0* 1.2* 1.3*   ALKPHOS 118 97 100     Recent Labs     03/30/22 1802   LIPASE 22.0     Recent Labs     03/31/22 1952   PROTIME 10.4   INR 0.92         Imaging  VL AORTA/IVC/ILIAC/BYPASS GRAFT LIMITED         US ABDOMEN LIMITED Specify organ? LIVER, GALLBLADDER   Preliminary Result   Hepatic steatosis with subtle/early cirrhotic changes suspected. CT ABDOMEN PELVIS W IV CONTRAST Additional Contrast? None   Final Result   No acute pathology in the abdomen and pelvis. No evidence of acute   appendicitis. Endoscopy      ASSESSMENT AND RECOMMENDATIONS   Leticia Pedroza is a 47 y.o. male with  PMH of   alcohol abuse who presented on 3/30/2022 with abdominal pain, nausea and vomiting. Work-up in the ED notable for elevated LFTs, elevated lactic acid, LAUREN. He was admitted and placed on IV fluids. We have been consulted regarding elevated LFTs    1. Abnormal LFTs suspect secondary to alcohol hepatitis. AST >ALT. RUQ US neg for gallstones, Doppler US showed patent portal vein. Madrey DF score <32,  no asterixis on exam, no indication for steroids. 2. Nausea, vomiting, abdominal pain. Suspect related to alcohol intoxication. He presented to the ED intoxicated. His symptoms have resolved today. CT showed no acute findings. RUQ US neg for gallstones. 3. Alcohol abuse disorder. Encouraged alcohol abstinence. RUQ shows increased echogenicity of the liver possibly due to subtle/early liver disease.   Strongly encouraged ETOH abstinence      RECOMMENDATIONS:    Diet as tolerated  Encourage alcohol abstinence  Ok from GI standpoint for discharge    If you have any questions or need any further information, please feel free to contact us [phone]. Thank you for allowing us to participate in the care of Beto. The note was completed using Dragon voice recognition transcription. Every effort was made to ensure accuracy; however, inadvertent transcription errors may be present despite my best efforts to edit errors.     Tracey NICOLE

## 2022-04-01 NOTE — PLAN OF CARE
Problem: Skin Integrity:  Goal: Skin integrity will stabilize  Description: Skin integrity will stabilize  Outcome: Ongoing  Note: D: Pt.  With pink stage 2 area to posterior L knee  A: area cleansed with normal saline, non adherent pad applied and wrapped with kerlex

## 2022-04-01 NOTE — PLAN OF CARE
Problem: Skin Integrity:  Goal: Skin integrity will stabilize  Description: Skin integrity will stabilize  4/1/2022 1058 by Chiquita Ervin RN  Outcome: Ongoing

## 2022-04-01 NOTE — CARE COORDINATION
DISCHARGE SUMMARY     DATE OF DISCHARGE: 04/01/2022    DISCHARGE DESTINATION: Porfirio Walden    Discharging to Facility/ Agency   · Name:  [facility]  · Address:  [address] [place] [address] Osei Quezada [facility]   · Phone:  [phone]  · Fax:  [phone]      TRANSPORTATION: [place] [address] Name:  [facility] [address] up Time: 9:15pm    Phone Number: [phone]    COMMENTS: DEMETRIUS spoke with Dori Ku at [facility] who stated that he will pull the orders from [facility] for this pt. Dori Ku is aware that this pt will be picked up at 9:15pm.  RN notified.   J Carlos Purdy, [place]  [phone]  Electronically signed by Audrey Lyn on 4/1/2022 at 4:15 PM

## 2022-04-01 NOTE — DISCHARGE INSTR - COC
Continuity of Care Form    Patient Name: Scout Kasper   :  1970  MRN:  [id number]    Admit date:  3/30/2022  Discharge date:  ***    Code Status Order: Full Code   Advance Directives:      Admitting Physician:  Karlos Fagan MD  PCP: Fara Helms MD    Discharging Nurse: [facility] Unit/Room#: F5Z-5258/4126-93  Discharging Unit Phone Number: ***    Emergency Contact:   Extended Emergency Contact Information  [facility] Phone: [phone]  Relation: Brother/Sister  Secondary Emergency Contact: Mikey Wade Phone: [phone]  Relation: Brother/Sister    Past Surgical History:  History reviewed. No pertinent surgical history. Immunization History: There is no immunization history on file for this patient.     Active Problems:  Patient Active Problem List   Diagnosis Code    Lactic acidosis K44.4    Alcoholic cirrhosis (HCC) D72.85    Abdominal pain R10.9    Nausea and vomiting R11.2    Elevated lactic acid level R79.89    High anion gap metabolic acidosis Z47.6    Acute renal failure (ARF) (HCC) N17.9    Hypoalbuminemia E88.09    Transaminitis R74.01    Elevated bilirubin R17    Moderate protein-calorie malnutrition (HCC) E44.0    Alcohol intoxication ([facility] [address] F10.929       Isolation/Infection:   Isolation            No Isolation          Patient Infection Status       None to display            Nurse Assessment:  Last Vital Signs: /82   Pulse 84   Temp 98.2 °F (36.8 °C) (Oral)   Resp 20   Ht 6' 2\" (1.88 m)   Wt 212 lb 15.4 oz (96.6 kg)   SpO2 99%   BMI 27.34 kg/m²     Last documented pain score (0-10 scale): Pain Level: 4  Last Weight:   Wt Readings from Last 1 Encounters:   22 212 lb 15.4 oz (96.6 kg)     Mental Status:  {IP PT MENTAL STATUS:}    IV Access:  { DAVID IV ACCESS:419005982}    Nursing Mobility/ADLs:  Walking   {CHP DME AXI}  Transfer  {CHP DME OSIC:952315801}  Bathing  {CHP DME XOIY:636258688}  Dressing  {CHP DME IOAW:756316917}  Toileting  {CHP DME NFLE:049332638}  Feeding  {CHP DME EZUU:740861150}  Med Admin  {CHP DME UYNJ:242889546}  Med Delivery   { DAVID MED Delivery:100355435}    Wound Care Documentation and Therapy:  Wound 22 Knee Left;Posterior pink stage 2 (Active)   Wound Cleansed Cleansed with saline 22   Dressing/Treatment Non adherent 22 0641   Number of days: 0        Elimination:  Continence: Bowel: {YES / SV:54754}  Bladder: {YES / RM:66875}  Urinary Catheter: {Urinary Catheter:837481012}   Colostomy/Ileostomy/Ileal Conduit: {YES / ML:94983}       Date of Last BM: ***    Intake/Output Summary (Last 24 hours) at 2022 0931  Last data filed at 3/31/2022 1809  Gross per 24 hour   Intake 1970.77 ml   Output 875 ml   Net 1095.77 ml     I/O last 3 completed shifts:   In: 2330.8 [P.O.:360; I.V.:1970.8]  Out: 1075 [Urine:875; Emesis/NG output:200]    Safety Concerns:     [address] [place] Safety Concerns:124863238}    Impairments/Disabilities:      [address] [place] Impairments/Disabilities:149490552}    Nutrition Therapy:  Current Nutrition Therapy:   [address] [place] Diet List:573507549}    Routes of Feeding: {[facility] DME Other Feedings:872480992}  Liquids: {Slp liquid thickness:41599}  Daily Fluid Restriction: {CHP DME Yes amt example:641016663}  Last Modified Barium Swallow with Video (Video Swallowing Test): {Done Not Done VGLO:055897041}    Treatments at the Time of Hospital Discharge:   Respiratory Treatments: ***  Oxygen Therapy:  {Therapy; copd oxygen:77164}  Ventilator:    { CC Vent LSNA:286314024}    Rehab Therapies: {THERAPEUTIC INTERVENTION:6164624709}  Weight Bearing Status/Restrictions: [address]  Weight Bearin}  Other Medical Equipment (for information only, NOT a DME order):  {EQUIPMENT:366062472}  Other Treatments: ***    Patient's personal belongings (please select all that are sent with patient):  {IN[facility]ENC[facility] DME Belongings:770214934}    RN SIGNATURE: {Esignature:931971142}    CASE MANAGEMENT/SOCIAL WORK SECTION    Inpatient Status Date: 03/30/2022    Readmission Risk Assessment Score:  Readmission Risk              Risk of Unplanned Readmission:  12       Discharging to Facility/ Agency   Name:  [facility]  Address:  [address] [place] [address]   Phone:  [phone]  Fax:  [phone]          / signature: Electronically signed by Ollie Campoverde on 4/1/2022 at 9:31 AM      PHYSICIAN SECTION    Prognosis: Good    Condition at Discharge: Stable    Rehab Potential (if transferring to Rehab): Good    Recommended Labs or Other Treatments After Discharge: PT/OT/RN as needed     Physician Certification: I certify the above information and transfer of Hari Almonte  is necessary for the continuing treatment of the diagnosis listed and that he requires [facility] for less 30 days.      Update Admission H&P: No change in H&P    PHYSICIAN SIGNATURE:  Electronically signed by ALEAH Alvarez CNP on 4/1/22 at 4:09 PM MINNIE/ Luci Patel

## 2022-04-01 NOTE — PROGRESS NOTES
Hospital Medicine Progress Note      Admit Date: 3/30/2022       CC: F/U for abd pain/n/v    HPI: Pt is an 47 y. o. year-old male with a history of chronic daily alcohol abuse who presents to the emergency room for evaluation following an acute onset of abdominal pain, nausea and vomiting.  He signed himself out of his nursing home today to go buy alcohol.  Per ER reports he was drinking heavily today.  In the emergency room he was found to be intoxicated. [facility] had mild abdominal tenderness on exam. [facility] had acute renal failure and an elevated lactic acid level.  He is being admitted for further evaluation and treatment. Associated signs and symptoms do not include fever or chills, diarrhea, melena, hematochezia, hematemesis, sweats, dark urine or jaundice.        3/31: cont IVF for elevated lactic acid. Cont to check LA daily until normalizes. Started librium to avoid withdrawal. States he does feel he is having some withdrawal symptoms. On CIWA      Patient continues to require aggressive hydration due to lactic acidosis. Improving. .patient with LAUREN on admission. Improving with hydration     Elevated LFTs. Cont to monitor. Can consult GI if worsens. Patient requires inpatient admission        Interval History/Subjective: lactic acid normalized. Patient states he is feeling better. No further signs of withdrawal. Cont IVF liter then can stop IVF. He is eating and drinking fine. He can go after magnesium given. Filled Rx to retail pharmacy for vitamins, folic acid and thiamine. Review of Systems:       The patient denied headaches, visual changes, LOC, SOB, CP, ABD pain, N/V/D, skin changes, new or worsening weakness or neuromuscular deficits. Comprehensive ROS negative except as mentioned above.     Past Medical History:        Diagnosis Date    Alcohol abuse     Alcoholic cirrhosis of liver (HCC)     Anemia     Gastro-esophageal reflux disease with esophagitis     Hyperlipidemia     Thrombocytopenia ([facility] [address]     Vitamin D deficiency        Past Surgical History:    History reviewed. No pertinent surgical history. Allergies: Other    Past medical and surgical history reviewed. Any changes have been noted. PHYSICAL EXAM:  /82   Pulse 85   Temp 98.2 °F (36.8 °C) (Oral)   Resp 20   Ht 6' 2\" (1.88 m)   Wt 212 lb 15.4 oz (96.6 kg)   SpO2 99%   BMI 27.34 kg/m²       Intake/Output Summary (Last 24 hours) at 4/1/2022 1314  Last data filed at 4/1/2022 2056  Gross per 24 hour   Intake 2192.88 ml   Output 875 ml   Net 1317.88 ml        General appearance:   No apparent distress, appears stated age. Cooperative. HEENT:  Normocephalic, atraumatic. PERRLA. EOMi. Conjunctivae/corneas clear, no icterus, non-injected. Neck: Supple, with full range of motion. No jugular venous distention. Trachea midline. Respiratory:  Normal respiratory effort. Clear to auscultation, bilaterally without Rales/Wheezes/Rhonchi. Cardiovascular:  Regular rate and rhythm without murmurs, rubs or gallops. Abdomen: Soft, non-tender, non-distended, without rebound or guarding. Normal bowel sounds. Musculoskeletal:  No clubbing, cyanosis or edema bilaterally. Full range of motion without deformity. Skin: Skin color, texture, turgor normal.  No rashes or lesions. Neurologic:  Neurovascularly intact without any focal sensory/motor deficits. Cranial nerves: II-XII intact, grossly intact. No facial asymmetry, tongue midline.    Psychiatric:  Alert and oriented, thought content appropriate  Capillary Refill: Brisk,< 3 seconds   Peripheral Pulses: +2 palpable, equal bilaterally       LABS:    Lab Results   Component Value Date    WBC 10.9 04/01/2022    HGB 12.7 (L) 04/01/2022    HCT 38.2 (L) 04/01/2022    MCV 86.3 04/01/2022    PLT 90 (L) 04/01/2022    LYMPHOPCT 10.8 04/01/2022    RBC 4.42 04/01/2022    MCH 28.6 04/01/2022    MCHC 33.1 04/01/2022    RDW 16.9 (H) 04/01/2022       Lab Results   Component Value Date CREATININE 0.7 (L) 04/01/2022    BUN 7 04/01/2022     04/01/2022    K 3.5 04/01/2022     04/01/2022    CO2 22 04/01/2022       Lab Results   Component Value Date    MG 1.60 04/01/2022       Lab Results   Component Value Date    ALT 49 (H) 04/01/2022    AST 88 (H) 04/01/2022    ALKPHOS 100 04/01/2022    BILITOT 1.3 (H) 04/01/2022        No flowsheet data found. No results found for: LABA1C    Imaging:  VL AORTA/IVC/ILIAC/BYPASS GRAFT LIMITED         US ABDOMEN LIMITED Specify organ? LIVER, GALLBLADDER   Preliminary Result   Hepatic steatosis with subtle/early cirrhotic changes suspected. CT ABDOMEN PELVIS W IV CONTRAST Additional Contrast? None   Final Result   No acute pathology in the abdomen and pelvis. No evidence of acute   appendicitis. Scheduled and prn Medications:    Scheduled Meds:   sodium chloride  1 g Oral Daily    NIFEdipine  30 mg Oral Daily    folic acid  1 mg Oral Daily    therapeutic multivitamin-minerals  1 tablet Oral Daily    thiamine mononitrate  100 mg Oral Daily    sodium chloride flush  10 mL IntraVENous 2 times per day    enoxaparin  40 mg SubCUTAneous Daily    pantoprazole  40 mg Oral BID AC    chlordiazePOXIDE  10 mg Oral TID     Continuous Infusions:   sodium chloride      sodium chloride 100 mL/hr at 04/01/22 0958     PRN Meds:.sodium chloride flush, sodium chloride, ondansetron, polyethylene glycol, acetaminophen **OR** acetaminophen, promethazine    Assessment & Plan:          Acute renal failure  -likely secondary to alcohol consumption and poor oral intake of foods and fluids.  We will hydrate with isotonic saline and recheck renal function in the morning.     Chronic daily consumption of alcohol, current alcohol intoxication -  has been consultd to provide information for alcohol cessation. Will provide Thiamine, Folate and a Multivitamin.  Pt will be monitored for withdrawal symptoms, and if necessary the [facility] protocol will be started to manage withdrawal.   - librium 10mg tid - can titrate down as able. To avoid withdrawal  - CIWA  - Alcohol use increases the risk of Cancer (cancer sites linked to alcohol use include the mouth, pharynx (throat), larynx (voice box), esophagus, liver, breast, and colorectal region), Cardiovascular disease, Cirrhosis, Dementia, Depression, Seizures, Gout, Hypertension, Infectious disease, Nerve damage (including alcoholic neuropathy, muscle weakness, incontinence, constipation, erectile dysfunction, and other problems), Pancreatitis and more.      Abdominal pain   - CT abdomen and pelvis with no acute pathology in the abdomen and pelvis.   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 Will follow serial Lactic Acid levels. - Lactic acidosis occurs when ethanol metabolism results in a high hepatic NADH/NAD ratio, driving pyruvate metabolism towards lactate and inhibiting gluconeogenesis. The excess lactate is exported from the liver and appears as a lactic acidosis      Alcoholic hepatitis with transaminitis   -patient advised to stop drinking alcohol totally.    We will monitor his LFTs while here,   -  GI was consulted. - no indication for steroids at this time. No asterixs. , no cirrhosis on CT  - RUQ u/s pending       Moderate protein-calorie malnutrition (hypoalbuminemia) - Patient encouraged to eat a balanced diet, including protein-rich foods. Will supply high protein, high calorie supplements          Continue current regimen/therapies. Monitor. Adjust medical regimen as appropriate.     Body mass index is 27.34 kg/m². The patient and / or the family were informed of the results of any tests, a time was given to answer questions, a plan was proposed and they agreed with plan. DVT ppx: lovenox  GI ppx: protonix    Diet: ADULT DIET;  Regular    Consults:  IP CONSULT TO HOSPITALIST  CHEMICAL DEPENDENCY REFERRAL FOR SOCIAL SERVICE CONSULT  IP CONSULT TO GI    DISPO/placement plan: pending    Code Status: Full Code      ALEAH Lowery CNP  04/01/22

## 2022-04-02 NOTE — PROGRESS NOTES
A: attempted to call [facility] x2 (without answer) for report as Pt. Is going to be discharged.  Discharge paperwork and AVS sent with Pt.  R: out per ambulance service without difficulties

## 2022-04-06 NOTE — PROGRESS NOTES
Physician Progress Note      Leroy Chow  [facility] #:                  [id number]  :                       1970  ADMIT DATE:       3/30/2022 5:34 PM  [address] DATE:        2022 10:05 PM  RESPONDING  PROVIDER #:        Enrico Martinez CNP          QUERY TEXT:    Patient admitted with LAUREN, alcoholic gastritis, and alcoholic hepatitis. Noted   documentation of \"moderate malnutrition\" in H&P and subsequent progress note. \" No malnutrition\" documented per dietary consult on 3/31. In order to   support the diagnosis of moderate malnutrition, please include additional   clinical indicators in your documentation. Or please document if the   diagnosis of moderate malnutrition has been ruled out after further study. The medical record reflects the following:  Risk Factors: ETOH abuse  Clinical Indicators: per dietary consult, shows mild decrease in energy. No   further indicators for malnutrition  Treatment: dietary consult    Thank you,  Sol Pineda RN, BSN, CCD  [email or web address]  Options provided:  -- Moderate malnutrition present as evidenced by, Please document evidence. -- Moderate malnutrition was ruled out  -- Other - I will add my own diagnosis  -- Disagree - Not applicable / Not valid  -- Disagree - Clinically unable to determine / Unknown  -- Refer to Clinical Documentation Reviewer    PROVIDER RESPONSE TEXT:    Moderate malnutrition was ruled out after study.     Query created by: Arturo Mcmahon on 2022 6:48 AM      Electronically signed by:  Enrico Martinez CNP 2022 2:18 PM

## 2024-02-07 NOTE — PROGRESS NOTES
Date/Time    BUN 7 04/01/2022 07:16 AM    CREATININE 0.7 04/01/2022 07:16 AM     Lab Results   Component Value Date/Time    ALKPHOS 100 04/01/2022 07:16 AM    ALT 49 04/01/2022 07:16 AM    AST 88 04/01/2022 07:16 AM    PROT 7.0 04/01/2022 07:16 AM    BILITOT 1.3 04/01/2022 07:16 AM    BILIDIR 0.5 04/01/2022 07:16 AM    LABALBU 2.7 04/01/2022 07:16 AM       EKG: Sinus rhythm      Assessment and plan      54-year-old black gentleman from [place] with history of hypertension who was found to have a murmur.  Patient is asymptomatic  Indeed he has a systolic murmur best heard in the aortic area  An echocardiogram has been ordered to evaluate.    Hypertension  Blood pressure 134/74  On losartan and nifedipine well-controlled      Follow up in office in 1 month       Thank you very much for allowing me to participate in the care of your patient. Please do not hesitate to contact me if you have any questions.        Sincerely,    MERLE Knott M.D  [facility]

## 2024-02-08 ENCOUNTER — OFFICE VISIT (OUTPATIENT)
Dept: CARDIOLOGY CLINIC | Age: 54
End: 2024-02-08
Payer: MEDICAID

## 2024-02-08 VITALS
OXYGEN SATURATION: 97 % | HEIGHT: 74 IN | WEIGHT: 196 LBS | HEART RATE: 93 BPM | SYSTOLIC BLOOD PRESSURE: 134 MMHG | DIASTOLIC BLOOD PRESSURE: 74 MMHG | BODY MASS INDEX: 25.15 KG/M2

## 2024-02-08 DIAGNOSIS — R01.1 HEART MURMUR: Primary | ICD-10-CM

## 2024-02-08 PROCEDURE — 93000 ELECTROCARDIOGRAM COMPLETE: CPT | Performed by: INTERNAL MEDICINE

## 2024-02-08 PROCEDURE — 3017F COLORECTAL CA SCREEN DOC REV: CPT | Performed by: INTERNAL MEDICINE

## 2024-02-08 PROCEDURE — 99204 OFFICE O/P NEW MOD 45 MIN: CPT | Performed by: INTERNAL MEDICINE

## 2024-02-08 PROCEDURE — 1036F TOBACCO NON-USER: CPT | Performed by: INTERNAL MEDICINE

## 2024-02-08 PROCEDURE — G8419 CALC BMI OUT NRM PARAM NOF/U: HCPCS | Performed by: INTERNAL MEDICINE

## 2024-02-08 PROCEDURE — G8427 DOCREV CUR MEDS BY ELIG CLIN: HCPCS | Performed by: INTERNAL MEDICINE

## 2024-02-08 PROCEDURE — G8484 FLU IMMUNIZE NO ADMIN: HCPCS | Performed by: INTERNAL MEDICINE

## 2024-02-08 RX ORDER — LANOLIN ALCOHOL/MO/W.PET/CERES
3 CREAM (GRAM) TOPICAL DAILY
COMMUNITY

## 2024-02-08 RX ORDER — IBUPROFEN 400 MG/1
400 TABLET ORAL EVERY 6 HOURS PRN
COMMUNITY

## 2024-02-08 RX ORDER — DIPHENHYDRAMINE HCL 25 MG
25 CAPSULE ORAL EVERY 6 HOURS PRN
COMMUNITY

## 2024-02-08 RX ORDER — LOSARTAN POTASSIUM 25 MG/1
TABLET ORAL
COMMUNITY
Start: 2024-01-12

## 2024-03-06 NOTE — PROGRESS NOTES
[facility]  Cardiology Note      Chucky Holder  1970, 54 y.o.        CC: Aortic stenosis/hypertension        Jose Su MD:      HPI:   This is a 54-year-old gentleman from [place] who was referred for evaluation of murmur that his primary care physician found.    Review of his chart indicates that the patient has a history of hypertension alcohol abuse cirrhosis GERD thrombocytopenia.  He is on nifedipine losartan.  Creatinine was normal in 2022      Patient was seen a few weeks ago for a murmur.  He was asymptomatic.  Echo shows mild aortic stenosis with a mean gradient of 22 m of mercury    Past Medical History:   Diagnosis Date    Alcohol abuse     Alcoholic cirrhosis of liver (HCC)     Anemia     Gastro-esophageal reflux disease with esophagitis     Hyperlipidemia     Thrombocytopenia (HCC)     Vitamin D deficiency       No past surgical history on file.   No family history on file.   Social History     Tobacco Use    Smoking status: Never    Smokeless tobacco: Never   Vaping Use    Vaping Use: Never used   Substance Use Topics    Alcohol use: Yes     Comment: daily beer    Drug use: Never     Allergies   Allergen Reactions    Other      Bleach         Review of Systems -   Constitutional: Negative for weight gain/loss; malaise, fever  Respiratory: Negative for Asthma;  cough and hemoptysis  Cardiovascular: Negative for palpitations,dizziness   Gastrointestinal: Negative for abd.pain; constipation/diarrhea;    Genitourinary: Negative for stones; hematuria; frequency hesitancy  Integumentt: Negative for rash or pruritis  Hematologic/lymphatic: Negative for blood dyscrasia; leukemia/lymphoma  Musculoskeletal: Negative for Connective tissue disease  Neurological:  Negative for Seizure   Behavioral/Psych:Negative for Bipolar disorder, Schizophrenia; Dementia  Endocrine: negative for thyroid, parathyroid disease    Physical Examination:    There were no vitals taken for this visit.   HEENT:

## 2024-03-13 ENCOUNTER — HOSPITAL ENCOUNTER (OUTPATIENT)
Dept: CARDIOLOGY | Age: 54
Discharge: HOME | End: 2024-03-13
Payer: MEDICAID

## 2024-03-13 DIAGNOSIS — R01.1 HEART MURMUR: ICD-10-CM

## 2024-03-13 PROCEDURE — 93306 TTE W/DOPPLER COMPLETE: CPT

## 2024-03-14 ENCOUNTER — OFFICE VISIT (OUTPATIENT)
Dept: CARDIOLOGY CLINIC | Age: 54
End: 2024-03-14
Payer: MEDICAID

## 2024-03-14 VITALS
SYSTOLIC BLOOD PRESSURE: 110 MMHG | DIASTOLIC BLOOD PRESSURE: 60 MMHG | BODY MASS INDEX: 25.23 KG/M2 | WEIGHT: 196.6 LBS | OXYGEN SATURATION: 96 % | HEIGHT: 74 IN | HEART RATE: 93 BPM

## 2024-03-14 DIAGNOSIS — I35.0 NONRHEUMATIC AORTIC VALVE STENOSIS: Primary | ICD-10-CM

## 2024-03-14 PROCEDURE — 1036F TOBACCO NON-USER: CPT | Performed by: INTERNAL MEDICINE

## 2024-03-14 PROCEDURE — G8427 DOCREV CUR MEDS BY ELIG CLIN: HCPCS | Performed by: INTERNAL MEDICINE

## 2024-03-14 PROCEDURE — G8419 CALC BMI OUT NRM PARAM NOF/U: HCPCS | Performed by: INTERNAL MEDICINE

## 2024-03-14 PROCEDURE — 99213 OFFICE O/P EST LOW 20 MIN: CPT | Performed by: INTERNAL MEDICINE

## 2024-03-14 PROCEDURE — G8484 FLU IMMUNIZE NO ADMIN: HCPCS | Performed by: INTERNAL MEDICINE

## 2024-03-14 PROCEDURE — 3017F COLORECTAL CA SCREEN DOC REV: CPT | Performed by: INTERNAL MEDICINE

## 2024-03-14 RX ORDER — ONDANSETRON 4 MG/1
4 TABLET, FILM COATED ORAL EVERY 8 HOURS PRN
COMMUNITY

## 2024-06-19 ENCOUNTER — APPOINTMENT (OUTPATIENT)
Dept: CT IMAGING | Age: 54
End: 2024-06-19
Payer: MEDICAID

## 2024-06-19 ENCOUNTER — HOSPITAL ENCOUNTER (EMERGENCY)
Age: 54
Discharge: HOME | End: 2024-06-19
Payer: MEDICAID

## 2024-06-19 VITALS
RESPIRATION RATE: 25 BRPM | HEART RATE: 90 BPM | DIASTOLIC BLOOD PRESSURE: 71 MMHG | TEMPERATURE: 98.1 F | OXYGEN SATURATION: 100 % | SYSTOLIC BLOOD PRESSURE: 143 MMHG | BODY MASS INDEX: 24.15 KG/M2 | WEIGHT: 188.2 LBS | HEIGHT: 74 IN

## 2024-06-19 DIAGNOSIS — Z78.9 ALCOHOL USE: ICD-10-CM

## 2024-06-19 DIAGNOSIS — S09.90XA INJURY OF HEAD, INITIAL ENCOUNTER: ICD-10-CM

## 2024-06-19 DIAGNOSIS — N17.9 AKI (ACUTE KIDNEY INJURY) (HCC): ICD-10-CM
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
## 2024-06-19 LAB
ANION GAP SERPL CALCULATED.3IONS-SCNC: 10 MMOL/L (ref 3–16)
ANION GAP SERPL CALCULATED.3IONS-SCNC: 13 MMOL/L (ref 3–16)
BASOPHILS # BLD: 0 K/UL (ref 0–0.2)
BASOPHILS NFR BLD: 0.5 %
BUN SERPL-MCNC: 31 MG/DL (ref 7–20)
BUN SERPL-MCNC: 32 MG/DL (ref 7–20)
CALCIUM SERPL-MCNC: 8.9 MG/DL (ref 8.3–10.6)
CALCIUM SERPL-MCNC: 9.4 MG/DL (ref 8.3–10.6)
CHLORIDE SERPL-SCNC: 104 MMOL/L (ref 99–110)
CHLORIDE SERPL-SCNC: 99 MMOL/L (ref 99–110)
CO2 SERPL-SCNC: 18 MMOL/L (ref 21–32)
CO2 SERPL-SCNC: 21 MMOL/L (ref 21–32)
CREAT SERPL-MCNC: 1.2 MG/DL (ref 0.9–1.3)
CREAT SERPL-MCNC: 1.9 MG/DL (ref 0.9–1.3)
DEPRECATED RDW RBC AUTO: 14.4 % (ref 12.4–15.4)
EOSINOPHIL # BLD: 0.1 K/UL (ref 0–0.6)
EOSINOPHIL NFR BLD: 0.8 %
GFR SERPLBLD CREATININE-BSD FMLA CKD-EPI: 41 ML/MIN/{1.73_M2}
GFR SERPLBLD CREATININE-BSD FMLA CKD-EPI: 71 ML/MIN/{1.73_M2}
GLUCOSE SERPL-MCNC: 103 MG/DL (ref 70–99)
GLUCOSE SERPL-MCNC: 112 MG/DL (ref 70–99)
HCT VFR BLD AUTO: 28.8 % (ref 40.5–52.5)
HGB BLD-MCNC: 9.7 G/DL (ref 13.5–17.5)
LYMPHOCYTES # BLD: 0.6 K/UL (ref 1–5.1)
LYMPHOCYTES NFR BLD: 6.9 %
MCH RBC QN AUTO: 30.3 PG (ref 26–34)
MCHC RBC AUTO-ENTMCNC: 33.6 G/DL (ref 31–36)
MCV RBC AUTO: 90.1 FL (ref 80–100)
MONOCYTES # BLD: 1 K/UL (ref 0–1.3)
MONOCYTES NFR BLD: 11.9 %
NEUTROPHILS # BLD: 6.8 K/UL (ref 1.7–7.7)
NEUTROPHILS NFR BLD: 79.9 %
PLATELET # BLD AUTO: 314 K/UL (ref 135–450)
PMV BLD AUTO: 7.9 FL (ref 5–10.5)
POTASSIUM SERPL-SCNC: 4.5 MMOL/L (ref 3.5–5.1)
POTASSIUM SERPL-SCNC: 5.5 MMOL/L (ref 3.5–5.1)
RBC # BLD AUTO: 3.2 M/UL (ref 4.2–5.9)
SODIUM SERPL-SCNC: 130 MMOL/L (ref 136–145)
SODIUM SERPL-SCNC: 135 MMOL/L (ref 136–145)
WBC # BLD AUTO: 8.5 K/UL (ref 4–11)

## 2024-06-19 PROCEDURE — 96361 HYDRATE IV INFUSION ADD-ON: CPT

## 2024-06-19 PROCEDURE — 72125 CT NECK SPINE W/O DYE: CPT

## 2024-06-19 PROCEDURE — 93005 ELECTROCARDIOGRAM TRACING: CPT | Performed by: PHYSICIAN ASSISTANT

## 2024-06-19 PROCEDURE — 70450 CT HEAD/BRAIN W/O DYE: CPT

## 2024-06-19 PROCEDURE — 85025 COMPLETE CBC W/AUTO DIFF WBC: CPT

## 2024-06-19 PROCEDURE — 96360 HYDRATION IV INFUSION INIT: CPT

## 2024-06-19 PROCEDURE — 80048 BASIC METABOLIC PNL TOTAL CA: CPT

## 2024-06-19 PROCEDURE — 2580000003 HC RX 258: Performed by: PHYSICIAN ASSISTANT

## 2024-06-19 PROCEDURE — 99284 EMERGENCY DEPT VISIT MOD MDM: CPT

## 2024-06-19 PROCEDURE — 6370000000 HC RX 637 (ALT 250 FOR IP): Performed by: PHYSICIAN ASSISTANT

## 2024-06-19 RX ORDER — FOLIC ACID 1 MG/1
1 TABLET ORAL DAILY
Status: DISCONTINUED | OUTPATIENT
Start: 2024-06-19 | End: 2024-06-19 | Stop reason: HOSPADM

## 2024-06-19 RX ORDER — GAUZE BANDAGE 2" X 2"
100 BANDAGE TOPICAL DAILY
Status: DISCONTINUED | OUTPATIENT
Start: 2024-06-19 | End: 2024-06-19 | Stop reason: HOSPADM

## 2024-06-19 RX ORDER — 0.9 % SODIUM CHLORIDE 0.9 %
1000 INTRAVENOUS SOLUTION INTRAVENOUS ONCE
Status: COMPLETED | OUTPATIENT
Start: 2024-06-19 | End: 2024-06-19

## 2024-06-19 RX ADMIN — SODIUM CHLORIDE 1000 ML: 9 INJECTION, SOLUTION INTRAVENOUS at 15:39

## 2024-06-19 RX ADMIN — Medication 100 MG: at 16:50

## 2024-06-19 RX ADMIN — SODIUM CHLORIDE 1000 ML: 9 INJECTION, SOLUTION INTRAVENOUS at 19:36

## 2024-06-19 RX ADMIN — FOLIC ACID 1 MG: 1 TABLET ORAL at 17:36

## 2024-06-19 ASSESSMENT — ENCOUNTER SYMPTOMS
RHINORRHEA: 0
SHORTNESS OF BREATH: 0
DIARRHEA: 0
NAUSEA: 0
ABDOMINAL PAIN: 0
CONSTIPATION: 0
EYE PAIN: 0
BACK PAIN: 0
COUGH: 0
VOMITING: 0
SORE THROAT: 0

## 2024-06-19 ASSESSMENT — LIFESTYLE VARIABLES
HOW OFTEN DO YOU HAVE A DRINK CONTAINING ALCOHOL: 4 OR MORE TIMES A WEEK
HOW MANY STANDARD DRINKS CONTAINING ALCOHOL DO YOU HAVE ON A TYPICAL DAY: 3 OR 4

## 2024-06-19 ASSESSMENT — PAIN DESCRIPTION - LOCATION: LOCATION: EYE

## 2024-06-19 ASSESSMENT — PAIN SCALES - GENERAL: PAINLEVEL_OUTOF10: 5

## 2024-06-19 ASSESSMENT — PAIN - FUNCTIONAL ASSESSMENT: PAIN_FUNCTIONAL_ASSESSMENT: 0-10

## 2024-06-19 ASSESSMENT — PAIN DESCRIPTION - FREQUENCY: FREQUENCY: CONTINUOUS

## 2024-06-19 ASSESSMENT — PAIN DESCRIPTION - DESCRIPTORS: DESCRIPTORS: ACHING

## 2024-06-19 ASSESSMENT — PAIN DESCRIPTION - PAIN TYPE: TYPE: ACUTE PAIN

## 2024-06-19 ASSESSMENT — PAIN DESCRIPTION - ORIENTATION: ORIENTATION: LEFT

## 2024-06-19 NOTE — ED PROVIDER NOTES
Physician in the absence of a cardiologist.  Please see their note for interpretation of EKG.    RADIOLOGY:   Non-plain film images such as CT, Ultrasound and MRI are read by the radiologist. Plain radiographic images are visualized and preliminarily interpreted by the ED Provider with the below findings:        Interpretation per the Radiologist below, if available at the time of this note:    CT HEAD WO CONTRAST   Final Result   No acute intracranial abnormality.         CT CERVICAL SPINE WO CONTRAST   Final Result   1. No acute fracture or traumatic malalignment of the cervical spine.   2. Mild degenerative disc disease at C4-5.           No results found.    No results found.    PROCEDURES   Unless otherwise noted below, none     Procedures    CRITICAL CARE TIME (.cctime)   I personally saw the patient and independently provided 33 minutes of non-concurrent critical care time out of the total critical care time provided.  This excludes time spent doing separately billable procedures.  This includes time at the bedside, data interpretation, medication management, obtaining critical history from collateral sources if the patient is unable to provide it directly, and physician consultation.  Specifics of interventions taken and potentially life-threatening diagnostic considerations are listed above in the medical decision making.      PAST MEDICAL HISTORY      has a past medical history of Alcohol abuse, Alcoholic cirrhosis of liver (HCC), Anemia, Gastro-esophageal reflux disease with esophagitis, Hyperlipidemia, Thrombocytopenia (HCC), and Vitamin D deficiency.     Chronic Conditions affecting Care:     EMERGENCY DEPARTMENT COURSE and DIFFERENTIAL DIAGNOSIS/MDM:   Vitals:    Vitals:    06/19/24 2100 06/19/24 2115 06/19/24 2130 06/19/24 2145   BP: (!) 142/72 139/80 (!) 143/71    Pulse: 90 92 89 90   Resp: 24 29 22 25   Temp:       TempSrc:       SpO2: 100% 99% 99% 100%   Weight:       Height:           Patient was

## 2024-06-20 LAB
EKG ATRIAL RATE: 81 BPM
EKG DIAGNOSIS: NORMAL
EKG P AXIS: 40 DEGREES
EKG P-R INTERVAL: 142 MS
EKG Q-T INTERVAL: 364 MS
EKG QRS DURATION: 86 MS
EKG QTC CALCULATION (BAZETT): 422 MS
EKG R AXIS: 36 DEGREES
EKG T AXIS: 25 DEGREES
EKG VENTRICULAR RATE: 81 BPM